# Patient Record
Sex: FEMALE | URBAN - METROPOLITAN AREA
[De-identification: names, ages, dates, MRNs, and addresses within clinical notes are randomized per-mention and may not be internally consistent; named-entity substitution may affect disease eponyms.]

---

## 2017-01-31 NOTE — LETTER
December 11, 2023         Kevin Quan MD  [address]      Patient: Tommy Caro   YOB: 1945   Date of Visit: 12/11/2023       Dear Dr. Susie Viramontes,    I saw your patient, Tommy Caro, on 12/11/2023. Enclosed is my consultation / progress note from that encounter. Thank you for allowing me to participate in the care of this patient.     Sincerely,                           Roxy Villarreal MD  [facility] [employer], [address] [place], [place] [id number]    Document electronically generated by:  Roxy Villarreal MD on 12/11/2023    CC: No Recipients    Enclosure denies

## 2017-02-07 PROBLEM — E06.3 HYPOTHYROIDISM DUE TO HASHIMOTO'S THYROIDITIS: Status: ACTIVE | Noted: 2017-02-07

## 2017-02-07 PROBLEM — E03.8 HYPOTHYROIDISM DUE TO HASHIMOTO'S THYROIDITIS: Status: ACTIVE | Noted: 2017-02-07

## 2017-11-21 PROBLEM — M81.0 AGE-RELATED OSTEOPOROSIS WITHOUT CURRENT PATHOLOGICAL FRACTURE: Status: ACTIVE | Noted: 2017-11-21

## 2022-05-18 ENCOUNTER — APPOINTMENT (OUTPATIENT)
Dept: URBAN - METROPOLITAN AREA CLINIC 241 | Age: 77
Setting detail: DERMATOLOGY
End: 2022-05-27

## 2022-05-18 DIAGNOSIS — L40.0 PSORIASIS VULGARIS: ICD-10-CM

## 2022-05-18 PROCEDURE — OTHER PRESCRIPTION MEDICATION MANAGEMENT: OTHER

## 2022-05-18 PROCEDURE — OTHER ADDITIONAL NOTES: OTHER

## 2022-05-18 PROCEDURE — 99213 OFFICE O/P EST LOW 20 MIN: CPT

## 2022-05-18 PROCEDURE — OTHER COUNSELING: OTHER

## 2022-05-18 ASSESSMENT — LOCATION SIMPLE DESCRIPTION DERM
LOCATION SIMPLE: RIGHT FOOT
LOCATION SIMPLE: RIGHT HAND
LOCATION SIMPLE: LEFT FOOT
LOCATION SIMPLE: RIGHT FOREARM
LOCATION SIMPLE: LEFT HAND
LOCATION SIMPLE: LEFT FOREARM

## 2022-05-18 ASSESSMENT — LOCATION DETAILED DESCRIPTION DERM
LOCATION DETAILED: LEFT ULNAR DORSAL HAND
LOCATION DETAILED: RIGHT DORSAL FOOT
LOCATION DETAILED: LEFT DORSAL FOOT
LOCATION DETAILED: RIGHT PROXIMAL DORSAL FOREARM
LOCATION DETAILED: RIGHT RADIAL DORSAL HAND
LOCATION DETAILED: LEFT DISTAL DORSAL FOREARM

## 2022-05-18 ASSESSMENT — LOCATION ZONE DERM
LOCATION ZONE: ARM
LOCATION ZONE: HAND
LOCATION ZONE: FEET

## 2022-05-18 NOTE — PROCEDURE: PRESCRIPTION MEDICATION MANAGEMENT
Continue Regimen: Present management with rheumatologist. \\nContinue clobetasol 0.05% topical cream BID as needed
Detail Level: Zone
Render In Strict Bullet Format?: No

## 2022-05-18 NOTE — PROCEDURE: ADDITIONAL NOTES
Detail Level: Simple
Render Risk Assessment In Note?: no
Additional Notes: Stressed importance of regular skin exams. Advised patient to return to clinic if any new or changing spots. Emphasized that if patient goes on a biologic for psoriatic arthritis, she especially needs to be doing routine skin exams - she gives understanding.

## 2022-08-17 ENCOUNTER — APPOINTMENT (OUTPATIENT)
Dept: URBAN - METROPOLITAN AREA CLINIC 241 | Age: 77
Setting detail: DERMATOLOGY
End: 2022-08-19

## 2022-08-17 DIAGNOSIS — D22 MELANOCYTIC NEVI: ICD-10-CM

## 2022-08-17 DIAGNOSIS — L40.0 PSORIASIS VULGARIS: ICD-10-CM

## 2022-08-17 DIAGNOSIS — Z87.2 PERSONAL HISTORY OF DISEASES OF THE SKIN AND SUBCUTANEOUS TISSUE: ICD-10-CM

## 2022-08-17 DIAGNOSIS — M31.0 HYPERSENSITIVITY ANGIITIS: ICD-10-CM

## 2022-08-17 DIAGNOSIS — L85.3 XEROSIS CUTIS: ICD-10-CM

## 2022-08-17 DIAGNOSIS — D18.0 HEMANGIOMA: ICD-10-CM

## 2022-08-17 DIAGNOSIS — Z85.820 PERSONAL HISTORY OF MALIGNANT MELANOMA OF SKIN: ICD-10-CM

## 2022-08-17 DIAGNOSIS — L57.8 OTHER SKIN CHANGES DUE TO CHRONIC EXPOSURE TO NONIONIZING RADIATION: ICD-10-CM

## 2022-08-17 DIAGNOSIS — L82.1 OTHER SEBORRHEIC KERATOSIS: ICD-10-CM

## 2022-08-17 DIAGNOSIS — D49.2 NEOPLASM OF UNSPECIFIED BEHAVIOR OF BONE, SOFT TISSUE, AND SKIN: ICD-10-CM

## 2022-08-17 DIAGNOSIS — Z85.828 PERSONAL HISTORY OF OTHER MALIGNANT NEOPLASM OF SKIN: ICD-10-CM

## 2022-08-17 PROBLEM — D18.01 HEMANGIOMA OF SKIN AND SUBCUTANEOUS TISSUE: Status: ACTIVE | Noted: 2022-08-17

## 2022-08-17 PROBLEM — D22.62 MELANOCYTIC NEVI OF LEFT UPPER LIMB, INCLUDING SHOULDER: Status: ACTIVE | Noted: 2022-08-17

## 2022-08-17 PROBLEM — D22.61 MELANOCYTIC NEVI OF RIGHT UPPER LIMB, INCLUDING SHOULDER: Status: ACTIVE | Noted: 2022-08-17

## 2022-08-17 PROBLEM — D22.5 MELANOCYTIC NEVI OF TRUNK: Status: ACTIVE | Noted: 2022-08-17

## 2022-08-17 PROBLEM — D22.72 MELANOCYTIC NEVI OF LEFT LOWER LIMB, INCLUDING HIP: Status: ACTIVE | Noted: 2022-08-17

## 2022-08-17 PROBLEM — L30.9 DERMATITIS, UNSPECIFIED: Status: ACTIVE | Noted: 2022-08-17

## 2022-08-17 PROBLEM — D22.71 MELANOCYTIC NEVI OF RIGHT LOWER LIMB, INCLUDING HIP: Status: ACTIVE | Noted: 2022-08-17

## 2022-08-17 PROCEDURE — OTHER PRESCRIPTION MEDICATION MANAGEMENT: OTHER

## 2022-08-17 PROCEDURE — OTHER BIOPSY BY PUNCH METHOD: OTHER

## 2022-08-17 PROCEDURE — 99213 OFFICE O/P EST LOW 20 MIN: CPT | Mod: 25

## 2022-08-17 PROCEDURE — A4550 SURGICAL TRAYS: HCPCS

## 2022-08-17 PROCEDURE — OTHER COUNSELING: OTHER

## 2022-08-17 PROCEDURE — OTHER ADDITIONAL NOTES: OTHER

## 2022-08-17 PROCEDURE — 11104 PUNCH BX SKIN SINGLE LESION: CPT

## 2022-08-17 PROCEDURE — OTHER MIPS QUALITY: OTHER

## 2022-08-17 ASSESSMENT — LOCATION SIMPLE DESCRIPTION DERM
LOCATION SIMPLE: RIGHT THIGH
LOCATION SIMPLE: RIGHT FOOT
LOCATION SIMPLE: UPPER BACK
LOCATION SIMPLE: ABDOMEN
LOCATION SIMPLE: RIGHT UPPER ARM
LOCATION SIMPLE: LEFT THIGH
LOCATION SIMPLE: LEFT UPPER ARM
LOCATION SIMPLE: RIGHT HAND
LOCATION SIMPLE: LEFT CHEEK
LOCATION SIMPLE: LEFT PRETIBIAL REGION
LOCATION SIMPLE: LEFT FOREARM
LOCATION SIMPLE: LEFT FOOT
LOCATION SIMPLE: CHEST
LOCATION SIMPLE: RIGHT PRETIBIAL REGION
LOCATION SIMPLE: RIGHT FOREARM
LOCATION SIMPLE: LEFT HAND

## 2022-08-17 ASSESSMENT — LOCATION DETAILED DESCRIPTION DERM
LOCATION DETAILED: LEFT PROXIMAL DORSAL FOREARM
LOCATION DETAILED: RIGHT ANTERIOR PROXIMAL UPPER ARM
LOCATION DETAILED: RIGHT RADIAL DORSAL HAND
LOCATION DETAILED: RIGHT VENTRAL PROXIMAL FOREARM
LOCATION DETAILED: RIGHT DISTAL DORSAL FOREARM
LOCATION DETAILED: LEFT INFERIOR CENTRAL MALAR CHEEK
LOCATION DETAILED: LEFT ULNAR DORSAL HAND
LOCATION DETAILED: LEFT DORSAL FOOT
LOCATION DETAILED: RIGHT DORSAL FOOT
LOCATION DETAILED: UPPER STERNUM
LOCATION DETAILED: RIGHT PROXIMAL PRETIBIAL REGION
LOCATION DETAILED: EPIGASTRIC SKIN
LOCATION DETAILED: LEFT ANTERIOR DISTAL THIGH
LOCATION DETAILED: SUPERIOR THORACIC SPINE
LOCATION DETAILED: LEFT ANTERIOR PROXIMAL UPPER ARM
LOCATION DETAILED: INFERIOR THORACIC SPINE
LOCATION DETAILED: RIGHT ANTERIOR DISTAL THIGH
LOCATION DETAILED: LEFT ANTERIOR PROXIMAL THIGH
LOCATION DETAILED: LEFT DISTAL PRETIBIAL REGION
LOCATION DETAILED: LEFT VENTRAL PROXIMAL FOREARM
LOCATION DETAILED: LEFT DISTAL DORSAL FOREARM
LOCATION DETAILED: RIGHT ANTERIOR PROXIMAL THIGH
LOCATION DETAILED: LEFT MEDIAL SUPERIOR CHEST
LOCATION DETAILED: RIGHT PROXIMAL DORSAL FOREARM

## 2022-08-17 ASSESSMENT — LOCATION ZONE DERM
LOCATION ZONE: ARM
LOCATION ZONE: HAND
LOCATION ZONE: FACE
LOCATION ZONE: LEG
LOCATION ZONE: FEET
LOCATION ZONE: TRUNK

## 2022-08-17 NOTE — PROCEDURE: BIOPSY BY PUNCH METHOD
Validate Triangulation: No
Was A Bandage Applied: Yes
Suture Removal: 12 days
Biopsy Type: H and E
Billing Type: Third-Party Bill
Consent: Written consent was obtained and risks were reviewed including but not limited to scarring, infection, bleeding, scabbing, incomplete removal, nerve damage and allergy to anesthesia.
Information: Selecting Yes will display possible errors in your note based on the variables you have selected. This validation is only offered as a suggestion for you. PLEASE NOTE THAT THE VALIDATION TEXT WILL BE REMOVED WHEN YOU FINALIZE YOUR NOTE. IF YOU WANT TO FAX A PRELIMINARY NOTE YOU WILL NEED TO TOGGLE THIS TO 'NO' IF YOU DO NOT WANT IT IN YOUR FAXED NOTE.
Body Location Override (Optional - Billing Will Still Be Based On Selected Body Map Location If Applicable): left dorsal forearm
Size Of Lesion In Cm (Optional): 0
Anesthesia Volume In Cc (Will Not Render If 0): 0.5
Anesthesia Type: 1% lidocaine with epinephrine
Post-Care Instructions: I reviewed with the patient in detail post-care instructions. Patient is to keep the biopsy site dry overnight, and then apply bacitracin twice daily until healed. Patient may apply hydrogen peroxide soaks to remove any crusting.
Hemostasis: None
Detail Level: Detailed
Epidermal Sutures: 4-0 Nylon
Wound Care: Petrolatum
Dressing: bandage
Punch Size In Mm: 4
Home Suture Removal Text: Patient was provided a home suture removal kit and will remove their sutures at home.  If they have any questions or difficulties they will call the office.
Notification Instructions: Patient will be notified of biopsy results. However, patient instructed to call the office if not contacted within 2 weeks.

## 2022-08-17 NOTE — PROCEDURE: PRESCRIPTION MEDICATION MANAGEMENT
Render In Strict Bullet Format?: No
Detail Level: Simple
Detail Level: Zone
Initiate Treatment: Clobetasol 0.05% cream BID (pt has Rx at home)
Continue Regimen: systemic medication management with rheumatologist. \\nContinue clobetasol 0.05% topical cream BID as needed

## 2022-08-26 ENCOUNTER — APPOINTMENT (OUTPATIENT)
Dept: URBAN - METROPOLITAN AREA CLINIC 241 | Age: 77
Setting detail: DERMATOLOGY
End: 2022-08-26

## 2022-08-26 DIAGNOSIS — Z48.02 ENCOUNTER FOR REMOVAL OF SUTURES: ICD-10-CM

## 2022-08-26 PROCEDURE — OTHER SUTURE REMOVAL (GLOBAL PERIOD): OTHER

## 2022-08-26 PROCEDURE — 99024 POSTOP FOLLOW-UP VISIT: CPT

## 2022-08-26 ASSESSMENT — LOCATION ZONE DERM: LOCATION ZONE: ARM

## 2022-08-26 ASSESSMENT — LOCATION DETAILED DESCRIPTION DERM: LOCATION DETAILED: LEFT VENTRAL PROXIMAL FOREARM

## 2022-08-26 ASSESSMENT — LOCATION SIMPLE DESCRIPTION DERM: LOCATION SIMPLE: LEFT FOREARM

## 2022-08-26 NOTE — PROCEDURE: SUTURE REMOVAL (GLOBAL PERIOD)
Add 21783 Cpt? (Important Note: In 2017 The Use Of 36651 Is Being Tracked By Cms To Determine Future Global Period Reimbursement For Global Periods): yes
Detail Level: Detailed
Body Location Override (Optional - Billing Will Still Be Based On Selected Body Map Location If Applicable): left dorsal forearm

## 2023-08-15 ENCOUNTER — TELEPHONE (OUTPATIENT)
Dept: RHEUMATOLOGY | Facility: CLINIC | Age: 78
End: 2023-08-15

## 2023-08-15 NOTE — TELEPHONE ENCOUNTER
[facility] in [facility] called. Patient needs new order for infusion.      Fax number:[phone]    LOV: 4/6/23  Future Appointments   Date Time Provider Roseann Bronson   8/24/2023  9:20 AM Myriam Lewis MD [facility] [address]   Labs:

## 2023-08-24 ENCOUNTER — OFFICE VISIT (OUTPATIENT)
Dept: RHEUMATOLOGY | Facility: CLINIC | Age: 78
End: 2023-08-24
Payer: MEDICARE

## 2023-08-24 VITALS
SYSTOLIC BLOOD PRESSURE: 140 MMHG | WEIGHT: 194 LBS | TEMPERATURE: 98 F | BODY MASS INDEX: 30.45 KG/M2 | HEIGHT: 67 IN | HEART RATE: 53 BPM | DIASTOLIC BLOOD PRESSURE: 70 MMHG | RESPIRATION RATE: 16 BRPM | OXYGEN SATURATION: 98 %

## 2023-08-24 DIAGNOSIS — M06.09 RHEUMATOID ARTHRITIS OF MULTIPLE SITES WITHOUT RHEUMATOID FACTOR (HCC): ICD-10-CM

## 2023-08-24 DIAGNOSIS — M81.8 OTHER OSTEOPOROSIS WITHOUT CURRENT PATHOLOGICAL FRACTURE: ICD-10-CM

## 2023-08-24 DIAGNOSIS — L40.50 PSORIATIC ARTHRITIS (HCC): Primary | ICD-10-CM

## 2023-08-24 DIAGNOSIS — M15.9 PRIMARY OSTEOARTHRITIS INVOLVING MULTIPLE JOINTS: ICD-10-CM

## 2023-08-24 DIAGNOSIS — M19.90 OSTEOARTHRITIS, UNSPECIFIED OSTEOARTHRITIS TYPE, UNSPECIFIED SITE: ICD-10-CM

## 2023-08-24 DIAGNOSIS — E27.40 CHRONIC ADRENAL INSUFFICIENCY (HCC): ICD-10-CM

## 2023-08-24 PROBLEM — M81.0 AGE-RELATED OSTEOPOROSIS WITHOUT CURRENT PATHOLOGICAL FRACTURE: Status: RESOLVED | Noted: 2017-11-21 | Resolved: 2023-08-24

## 2023-08-24 PROCEDURE — 99215 OFFICE O/P EST HI 40 MIN: CPT | Performed by: INTERNAL MEDICINE

## 2023-08-24 RX ORDER — CEPHALEXIN 250 MG/1
250 CAPSULE ORAL EVERY OTHER DAY
COMMUNITY

## 2023-08-24 RX ORDER — CERTOLIZUMAB PEGOL 200 MG/ML
400 INJECTION, SOLUTION SUBCUTANEOUS
COMMUNITY

## 2023-08-24 RX ORDER — ENALAPRIL MALEATE 10 MG/1
10 TABLET ORAL 2 TIMES DAILY
COMMUNITY
Start: 2023-05-31

## 2023-08-24 RX ORDER — LEVOTHYROXINE SODIUM 112 UG/1
112 TABLET ORAL
COMMUNITY
Start: 2023-06-12

## 2023-08-24 RX ORDER — FLUDROCORTISONE ACETATE 0.1 MG/1
0.1 TABLET ORAL DAILY
COMMUNITY

## 2023-08-24 RX ORDER — LEFLUNOMIDE 10 MG/1
10 TABLET ORAL DAILY
Qty: 30 TABLET | Refills: 1 | Status: SHIPPED | OUTPATIENT
Start: 2023-08-24 | End: 2023-08-25

## 2023-08-24 RX ORDER — AMLODIPINE BESYLATE 5 MG/1
5 TABLET ORAL DAILY
COMMUNITY
Start: 2023-01-25

## 2023-08-24 RX ORDER — CARVEDILOL 12.5 MG/1
12.5 TABLET ORAL 2 TIMES DAILY WITH MEALS
COMMUNITY

## 2023-08-24 NOTE — TELEPHONE ENCOUNTER
We need to take over cimzia injections at [facility] infusion center in [place]    Please see if this is possible    And patient getting labs Tuesday

## 2023-08-24 NOTE — TELEPHONE ENCOUNTER
CAPRINI SCORE    AGE RELATED RISK FACTORS                                                       MOBILITY RELATED FACTORS  [ ] Age 41-60 years                                            (1 Point)                  [ ] Bed rest                                                        (1 Point)  [ ] Age: 61-74 years                                           (2 Points)                [ ] Plaster cast                                                   (2 Points)  [x] Age= 75 years                                              (3 Points)                 [ ] Bed bound for more than 72 hours                   (2 Points)    DISEASE RELATED RISK FACTORS                                               GENDER SPECIFIC FACTORS  [ ] Edema in the lower extremities                       (1 Point)                  [ ] Pregnancy                                                     (1 Point)  [ ] Varicose veins                                               (1 Point)                  [ ] Post-partum < 6 weeks                                   (1 Point)             [ ] BMI > 25 Kg/m2                                            (1 Point)                  [ ] Hormonal therapy  or oral contraception            (1 Point)                 [ ] Sepsis (in the previous month)                        (1 Point)                  [ ] History of pregnancy complications  [ ] Pneumonia or serious lung disease                                               [ ] Unexplained or recurrent                       (1 Point)           (in the previous month)                               (1 Point)  [ ] Abnormal pulmonary function test                     (1 Point)                 SURGERY RELATED RISK FACTORS  [ ] Acute myocardial infarction                              (1 Point)                 [ ]  Section                                            (1 Point)  [ ] Congestive heart failure (in the previous month)  (1 Point)                 [ ] Minor surgery                                                 (1 Point)   [ ] Inflammatory bowel disease                             (1 Point)                 [ ] Arthroscopic surgery                                        (2 Points)  [ ] Central venous access                                    (2 Points)                [x] General surgery lasting more than 45 minutes   (2 Points)       [ ] Stroke (in the previous month)                          (5 Points)               [ ] Elective arthroplasty                                        (5 Points)                                                                                                                                               HEMATOLOGY RELATED FACTORS                                                 TRAUMA RELATED RISK FACTORS  [ ] Prior episodes of VTE                                     (3 Points)                 [ ] Fracture of the hip, pelvis, or leg                       (5 Points)  [ ] Positive family history for VTE                         (3 Points)                 [ ] Acute spinal cord injury (in the previous month)  (5 Points)  [ ] Prothrombin 75604 A                                      (3 Points)                 [ ] Paralysis  (less than 1 month)                          (5 Points)  [ ] Factor V Leiden                                             (3 Points)                 [ ] Multiple Trauma within 1 month                         (5 Points)  [ ] Lupus anticoagulants                                     (3 Points)                                                           [ ] Anticardiolipin antibodies                                (3 Points)                                                       [ ] High homocysteine in the blood                      (3 Points)                                             [ ] Other congenital or acquired thrombophilia       (3 Points)                                                [ ] Heparin induced thrombocytopenia                  (3 Points)                                        [x] Present or previous malignancy                     (2 points)  Total Score [     7     ] Order for Cimzia at [facility] faxed to [address] [place] [address] for approval.

## 2023-08-24 NOTE — PATIENT INSTRUCTIONS
OSTEOARTHRITIS    Fast Facts    Though some of the joint changes are irreversible, most patients will not need joint replacement surgery. OA symptoms (what you feel) can vary greatly among patients. A rheumatologist can detect arthritis and prescribe the proper treatment. The goal of treatment in OA is to reduce pain and improve function. Exercise is an important part of OA treatment, because it can decrease joint pain and improve function. At present, there is no treatment that can reverse the damage of OA in the joints. Researchers are trying to find ways to slow or reverse this joint damage. Osteoarthritis (also known as OA) is a common joint disease that most often affects middle-age to elderly people. It is commonly referred to as \"wear and tear\" of the joints, but we now know that OA is a disease of the entire joint, involving the cartilage, joint lining, ligaments, and bone. Although it is more common in older people, it is not really accurate to say that the joints are just \"wearing out. \" It is characterized by breakdown of the cartilage (the tissue that cushions the ends of the bones between joints), bony changes of the joints, deterioration of tendons and ligaments, and various degrees of inflammation of the joint lining (called the synovium). This arthritis tends to occur in the hand joints, spine, hips, knees, and great toes. The lifetime risk of developing OA of the knee is about 46%, and the lifetime risk of developing OA of the hip is 25%, according to the [facility], a long-term study from the [address] [place] [address] and sponsored by [employer] for [employer] and [employer] (often called the [employer]) and the [employer]. OA is a top cause of disability in older people. The goal of osteoarthritis treatment is to reduce pain and improve function.  There is no cure for the disease, but some treatments attempt to slow disease progression. What is osteoarthritis? OA is a frequently slowly progressive joint disease typically seen in middle-aged to elderly people. In osteoarthritis, the cartilage between the bones in the joint breaks down. This causes the affected bones to slowly get bigger. The joint cartilage often breaks down because of mechanical stress or biochemical changes within the body, causing the bone underneath to fail. OA can occur together with other types of arthritis, such as gout or rheumatoid arthritis. OA tends to affect commonly used joints such as the hands and spine, and the weight-bearing joints such as the hips and knees. Symptoms include:    Joint pain and stiffness    Knobby swelling at the joint    Cracking or grinding noise with joint movement    Decreased function of the joint    How do you treat osteoarthritis? There is no proven treatment yet that can reverse joint damage from OA. The goal of osteoarthritis treatment is to reduce pain and improve function of the affected joints. Most often, this is possible with a mixture of physical measures and drug therapy and, sometimes, surgery. Physical measures: Weight loss and exercise are useful in OA. Excess weight puts stress on your knee joints and hips and low back. For every 10 pounds of weight you lose over 10 years, you can reduce the chance of developing knee OA by up to 50 percent. Exercise can improve your muscle strength, decrease joint pain and stiffness, and lower the chance of disability due to OA. Also helpful are support (\"assistive\") devices, such as orthotics or a walking cane, that help you do daily activities. Heat or cold therapy can help relieve OA symptoms for a short time. Certain alternative treatments such as spa (hot tub), massage, and chiropractic manipulation can help relieve pain for a short time. They can be costly, though, and require repeated treatments.  Also, the long-term benefits of these alternative (sometimes called complementary or integrative) medicine treatments are unproven but are under study. Drug therapy: Forms of drug therapy include topical, oral (by mouth) and injections (shots). You apply topical drugs directly on the skin over the affected joints. These medicines include capsaicin cream, lidocaine and diclofenac gel. Oral pain relievers such as acetaminophen are common first treatments. So are nonsteroidal anti-inflammatory drugs (often called NSAIDs), which decrease swelling and pain. In 2010, the government (FDA) approved the use of duloxetine (Cymbalta) for chronic (long-term) musculoskeletal pain including from OA. This oral drug is not new. It also is in use for other health concerns, such as mood disorders, nerve pain and fibromyalgia. Patients with more serious pain may need stronger medications, such as prescription narcotics. Joint injections with corticosteroids (sometimes called cortisone shots) or with a form of lubricant called hyaluronic acid can give months of pain relief from OA. This lubricant is given in the knee, and these shots may help delay the need for a knee replacement by a few years in some patients. Surgery: Surgical treatment becomes an option for severe cases. This includes when the joint has serious damage, or when medical treatment fails to relieve pain and you have major loss of function. Surgery may involve arthroscopy, repair of the joint done through small incisions (cuts). If the joint damage cannot be repaired, you may need a joint replacement. Supplements: Many over-the-counter nutrition supplements have been used for osteoarthritis treatment. Most lack good research data to support their effectiveness and safety. Among the most widely used are calcium, vitamin D and omega-3 fatty acids. To ensure safety and avoid drug interactions, consult your doctor or pharmacist before using any of these supplements.  This is especially true when you are combining these supplements with prescribed

## 2023-08-25 ENCOUNTER — TELEPHONE (OUTPATIENT)
Dept: RHEUMATOLOGY | Facility: CLINIC | Age: 78
End: 2023-08-25

## 2023-08-25 RX ORDER — LEFLUNOMIDE 10 MG/1
10 TABLET ORAL DAILY
Qty: 30 TABLET | Refills: 1 | Status: SHIPPED | OUTPATIENT
Start: 2023-08-25

## 2023-08-25 NOTE — PROGRESS NOTES
Pt called office, [address] sent to wrong pharmacy. Pharmacy updated and prescription sent.

## 2023-09-22 RX ORDER — LEFLUNOMIDE 10 MG/1
10 TABLET ORAL DAILY
Qty: 30 TABLET | Refills: 1 | OUTPATIENT
Start: 2023-09-22

## 2023-09-22 NOTE — TELEPHONE ENCOUNTER
Future Appointments   Date Time Provider Roseann Bronson   12/11/2023  9:40 AM Jeannine Lewis Degree, MD [facility] [address]     Last office visit: 8/24/2023    Last fill: 8/25/2023 30 tab, 1 refill    Sending my chart message to pt stating there is another refill at their pharmacy

## 2023-10-23 RX ORDER — LEFLUNOMIDE 10 MG/1
10 TABLET ORAL DAILY
Qty: 30 TABLET | Refills: 1 | Status: SHIPPED | OUTPATIENT
Start: 2023-10-23

## 2023-10-23 NOTE — TELEPHONE ENCOUNTER
LOV: 08/24/23  Future Appointments   Date Time Provider Roseann Bronson   12/11/2023  9:40 AM Андрей Lewis MD [facility] [address]   LABS:   Scanned under Media on 09/19.

## 2023-12-11 ENCOUNTER — OFFICE VISIT (OUTPATIENT)
Dept: RHEUMATOLOGY | Facility: CLINIC | Age: 78
End: 2023-12-11
Payer: MEDICARE

## 2023-12-11 ENCOUNTER — TELEPHONE (OUTPATIENT)
Dept: RHEUMATOLOGY | Facility: CLINIC | Age: 78
End: 2023-12-11

## 2023-12-11 VITALS
BODY MASS INDEX: 29.82 KG/M2 | HEART RATE: 63 BPM | RESPIRATION RATE: 16 BRPM | TEMPERATURE: 97 F | WEIGHT: 190 LBS | DIASTOLIC BLOOD PRESSURE: 66 MMHG | HEIGHT: 67 IN | SYSTOLIC BLOOD PRESSURE: 130 MMHG | OXYGEN SATURATION: 99 %

## 2023-12-11 DIAGNOSIS — L40.50 PSORIATIC ARTHRITIS (HCC): Primary | ICD-10-CM

## 2023-12-11 DIAGNOSIS — E06.3 HYPOTHYROIDISM DUE TO HASHIMOTO'S THYROIDITIS: ICD-10-CM

## 2023-12-11 DIAGNOSIS — E27.40 CHRONIC ADRENAL INSUFFICIENCY (HCC): ICD-10-CM

## 2023-12-11 DIAGNOSIS — R53.82 CHRONIC FATIGUE: ICD-10-CM

## 2023-12-11 DIAGNOSIS — E03.8 HYPOTHYROIDISM DUE TO HASHIMOTO'S THYROIDITIS: ICD-10-CM

## 2023-12-11 PROCEDURE — 99215 OFFICE O/P EST HI 40 MIN: CPT | Performed by: INTERNAL MEDICINE

## 2023-12-11 RX ORDER — LEFLUNOMIDE 20 MG/1
20 TABLET ORAL DAILY
Qty: 90 TABLET | Refills: 0 | Status: SHIPPED | OUTPATIENT
Start: 2023-12-11

## 2023-12-11 RX ORDER — LEFLUNOMIDE 10 MG/1
10 TABLET ORAL DAILY
Qty: 30 TABLET | Refills: 3 | Status: SHIPPED | OUTPATIENT
Start: 2023-12-11 | End: 2023-12-11

## 2023-12-11 NOTE — TELEPHONE ENCOUNTER
Order for Cimzia 400mg monthly at [facility] faxed to [address] [place] [address] for approval

## 2023-12-12 NOTE — TELEPHONE ENCOUNTER
Order for Cimzia faxed over to [facility] 12/12/2023. Order and fax confirmation sent to scan.

## 2023-12-26 ENCOUNTER — TELEPHONE (OUTPATIENT)
Dept: RHEUMATOLOGY | Facility: CLINIC | Age: 78
End: 2023-12-26

## 2023-12-26 NOTE — TELEPHONE ENCOUNTER
Pt increased her leflunomide dosage at her last office visit in the middle of december and has noticed her blood pressure has gotten higher. This morning her bp was 166/95. It has since come down with activity to 144/83.    Please advise.

## 2023-12-26 NOTE — TELEPHONE ENCOUNTER
Called pt and informed her that Dr. Lewis doesn't feel that the change in bp is from the leflunomide, but probably adrenal insufficiency. Pt verbalized understanding and will reach out to both cardiologist and endocrinologist to update them on her symptoms.

## 2024-01-04 ENCOUNTER — TELEPHONE (OUTPATIENT)
Dept: RHEUMATOLOGY | Facility: CLINIC | Age: 79
End: 2024-01-04

## 2024-01-09 ENCOUNTER — TELEPHONE (OUTPATIENT)
Dept: RHEUMATOLOGY | Facility: CLINIC | Age: 79
End: 2024-01-09

## 2024-01-09 NOTE — TELEPHONE ENCOUNTER
Pt states she has also decreased her Arava to 10mg, pt felt more comfortable with that dosing. Pt hopes Dr. Lewis is ok with this.

## 2024-01-09 NOTE — TELEPHONE ENCOUNTER
Called pt, pt would like to know if lab orders were faxed. Explained orders faxed to [facility] at [phone] on 1/4/24.

## 2024-03-08 NOTE — TELEPHONE ENCOUNTER
Future Appointments   Date Time Provider Department Center   6/11/2024 10:00 AM Silvia Lewis MD [facility] [address]     Last office visit: 12/11/2023    Last lab: 11/10/2023 scanned in    Last fill: 12/11/2023 90 tab, 0 refills    Sending pt a my chart message to get labs done or fax last labs to the office

## 2024-03-11 ENCOUNTER — TELEPHONE (OUTPATIENT)
Dept: RHEUMATOLOGY | Facility: CLINIC | Age: 79
End: 2024-03-11

## 2024-03-26 RX ORDER — LEFLUNOMIDE 20 MG/1
20 TABLET ORAL DAILY
Qty: 90 TABLET | Refills: 0 | OUTPATIENT
Start: 2024-03-26

## 2024-03-26 NOTE — TELEPHONE ENCOUNTER
Called pt and asked if she had recent lab work done. Pt had labs done on 3/20/2024 at [facility]. (Called [phone] to get lab results faxed to [facility])    Pt stated she doesn't need a refill at this time. She is only taking half a tab so she has extra at home.     Declining refill

## 2024-05-17 ENCOUNTER — TELEPHONE (OUTPATIENT)
Dept: RHEUMATOLOGY | Facility: CLINIC | Age: 79
End: 2024-05-17

## 2024-05-17 DIAGNOSIS — L40.50 PSORIATIC ARTHRITIS (HCC): Primary | ICD-10-CM

## 2024-05-17 DIAGNOSIS — M06.09 RHEUMATOID ARTHRITIS OF MULTIPLE SITES WITHOUT RHEUMATOID FACTOR (HCC): ICD-10-CM

## 2024-05-17 DIAGNOSIS — R53.82 CHRONIC FATIGUE: ICD-10-CM

## 2024-05-17 NOTE — TELEPHONE ENCOUNTER
New standing lab orders have been entered into the system per patient's request. Patient notified. Orders have been faxed to [facility] per patient request at [phone].   ,DirectAddress_Unknown

## 2024-05-29 ENCOUNTER — TELEPHONE (OUTPATIENT)
Dept: RHEUMATOLOGY | Facility: CLINIC | Age: 79
End: 2024-05-29

## 2024-05-29 NOTE — TELEPHONE ENCOUNTER
Spoke to patient regarding her lab results. Informed patient that we never received her lab results and asked her to call [facility] and have them send the results to us for  to review. Patient verbalized understanding and stated that she will call and have the results sent to the office.

## 2024-05-29 NOTE — TELEPHONE ENCOUNTER
LOV: 12/11/2023    RTC: 6 months   Future Appointments   Date Time Provider Department Center   6/11/2024 10:00 AM Silvia Lewis MD [facility] [address]   LABS: not completed at this time.     LAST REFILL: 12/11/2023, Quantity 90 tablets, Refills 0    MCM sent as a reminder to complete all outstanding lab orders. Awaiting possible response.

## 2024-06-03 ENCOUNTER — TELEPHONE (OUTPATIENT)
Dept: RHEUMATOLOGY | Facility: CLINIC | Age: 79
End: 2024-06-03

## 2024-06-03 DIAGNOSIS — L40.50 PSORIATIC ARTHRITIS (HCC): Primary | ICD-10-CM

## 2024-06-03 DIAGNOSIS — M06.09 RHEUMATOID ARTHRITIS OF MULTIPLE SITES WITHOUT RHEUMATOID FACTOR (HCC): ICD-10-CM

## 2024-06-03 RX ORDER — LEFLUNOMIDE 20 MG/1
20 TABLET ORAL DAILY
Qty: 90 TABLET | Refills: 0 | Status: SHIPPED | OUTPATIENT
Start: 2024-06-03

## 2024-06-03 RX ORDER — LEFLUNOMIDE 20 MG/1
20 TABLET ORAL DAILY
Qty: 90 TABLET | Refills: 0 | OUTPATIENT
Start: 2024-06-03

## 2024-06-03 NOTE — TELEPHONE ENCOUNTER
LOV:   12/11/2023    Future Appointments   Date Time Provider Department Center   6/11/2024 10:00 AM Silvia Lewis MD [facility] [address]     LF:  12/11/2023    QTY:   90    Refills:  0    LABS: DRAWN just waiting on the results from [facility].

## 2024-06-03 NOTE — TELEPHONE ENCOUNTER
Spoke to patient and let her know that we have not received her lab results as of yet from [facility]. Patient states that she is going to call them back and ask them to refax the results to our office for Dr to review. Patient states that she needs a refill on her Leflunomide because she only has a pill for tomorrow and that is it.

## 2024-06-11 ENCOUNTER — OFFICE VISIT (OUTPATIENT)
Dept: RHEUMATOLOGY | Facility: CLINIC | Age: 79
End: 2024-06-11
Payer: MEDICARE

## 2024-06-11 VITALS
WEIGHT: 192 LBS | TEMPERATURE: 98 F | DIASTOLIC BLOOD PRESSURE: 60 MMHG | HEIGHT: 67 IN | SYSTOLIC BLOOD PRESSURE: 122 MMHG | RESPIRATION RATE: 16 BRPM | HEART RATE: 67 BPM | BODY MASS INDEX: 30.13 KG/M2 | OXYGEN SATURATION: 96 %

## 2024-06-11 DIAGNOSIS — E06.3 HYPOTHYROIDISM DUE TO HASHIMOTO'S THYROIDITIS: ICD-10-CM

## 2024-06-11 DIAGNOSIS — Z79.899 ENCOUNTER FOR DRUG THERAPY: ICD-10-CM

## 2024-06-11 DIAGNOSIS — M15.9 PRIMARY OSTEOARTHRITIS INVOLVING MULTIPLE JOINTS: ICD-10-CM

## 2024-06-11 DIAGNOSIS — E03.8 HYPOTHYROIDISM DUE TO HASHIMOTO'S THYROIDITIS: ICD-10-CM

## 2024-06-11 DIAGNOSIS — E27.40 CHRONIC ADRENAL INSUFFICIENCY (HCC): ICD-10-CM

## 2024-06-11 DIAGNOSIS — R53.83 OTHER FATIGUE: ICD-10-CM

## 2024-06-11 DIAGNOSIS — L40.50 PSORIATIC ARTHRITIS (HCC): ICD-10-CM

## 2024-06-11 DIAGNOSIS — D84.9 IMMUNOCOMPROMISED STATE (HCC): ICD-10-CM

## 2024-06-11 DIAGNOSIS — Z85.820 HISTORY OF MELANOMA: Primary | ICD-10-CM

## 2024-06-11 PROCEDURE — G2211 COMPLEX E/M VISIT ADD ON: HCPCS | Performed by: INTERNAL MEDICINE

## 2024-06-11 PROCEDURE — 99214 OFFICE O/P EST MOD 30 MIN: CPT | Performed by: INTERNAL MEDICINE

## 2024-06-11 NOTE — PROGRESS NOTES
[facility], [address] [place] [address]      Consult     Karmen Joseph Patient Status:  No patient class for patient encounter    1945 MRN [id number]   Location [facility], [address] [place] [address] Attending No att. providers found   Hosp Day # 0 PCP RAFITA MATA     Referring Provider: PCP    Reason for Consultation: Seronegative RA/psoriatic arthritis    Subjective:    Karmen Joseph is a 78 year old female with     Reason for Consult: history of seronegative rheumatoid arthritis? Psoriasis    HPI: 78-year-old woman comes in for further evaluation for worsening joint pain and stiffness swelling initially started 2021. She was seen by rheumatologist who suggested possibility likely of seronegative rheumatoid arthritis with normal CCP. There is no x-rays that were done at the time she was initially started on hydroxychloroquine and 2021 and then progress to treatment with methotrexate because of lack of response up to 15 mg a week. 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    Her rheumatologist at that point because of pharmacy stating that they cannot place both hydrocortisone and prednisone together stopped prednisone and all her medications including methotrexate and hydroxychloroquine    Patient states significant swelling and also noticed 4 months into treatment with hydroxychloroquine and 1-2 weeks on treatment with methotrexate developing psoriasis on her lower extremities and small patches and also a few areas on her scalp.    Patient's daughter who is also with her states she has psoriasis untreated.    She is also noted some nail bed changes of her feet and pitting of her nailbeds.    She has not been placed on Biologics.    She apparently has a history of osteoporosis thinks her last DEXA scan was 2 years ago she has been on Fosamax 70  mg once a week and per patient's daughter she is quite miserable after taking it with significant side effects but has continued despite this.    She also has hypothyroidism on Synthroid that is fairly well-controlled    She is on hydrochlorothiazide for blood pressure which is fairly well-controlled. States no known family history of autoimmune disease but does have history of gout that runs in the family there is no gout testing that was noted with labs    Patient states no shortness of breath or chest pain no fevers or chills or infections prior to the onset of symptoms her booster vaccination was December 2021 prior to the onset of her symptoms and her last vaccinations were February 2021 prior to that    She was last seen in clinic March 2022 as a new patient  We had diagnosed her with likelihood of psoriatic arthritis with significant psoriasis and polyarthritis  We have given her Depo Medrol 80 mg subcutaneous injection with prednisone and taper down to 10 mg daily  We had discussed addition of Biologics and after going back and forth with her dermatologist we initially tried stellara but her insurance denied this.  Because of her history of malignant melanoma and skin cancer we are reluctant to consider TNF treatment that we have discussed risks versus benefit at this time and they decided to proceed with cimzia with close monitoring with her dermatologist revolving Skin cancers  Potential risks and side effects discussed in detail today  Unfortunately other Biologics that we have discussed her not being covered  Long-term risk of steroids discussed.    She was last seen in clinic December 2023    Patient was off steroids and doing well on Cimzia injections    Had stopped sulfasalazine because of no benefit    Has no further stiffness or pain that is concerning at the ankles or feet.     Continued on leflunomide 20 mg daily    States she is overdue for her Cimzia injection by 1 week and has noticed mild  stiffness in her hands.      Per patient's daughter closer to her infusion injection of Cimzia she does get mild stiffness and swelling that is more annoying and bothersome.    She has remained off steroids.  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 and is followed by endocrinology    Patient aches and pains in the neck and back but not too problematic they are not interested in proceeding with imaging is open to physical therapy for strengthening    Her blood pressure has been overall stable and is followed by cardiology and endocrinology    Did have 1 hospitalization January 2024 for blood pressure fluctuation but otherwise doing well    Previously in December 2022 she also stopped hydrochlorothiazide because of problems with dehydration and hyponatremia with her infections and her cardiologist had added low-dose Norvasc 5 mg and an ARB within the last few weeks    She is seeing cardiology and endocrinology today    The endocrinologist does thinks the fluctuations in her blood pressure could also be 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 She states the blood pressure was a slight problem even before and possibility of being off and taken off the hydrochlorothiazide is likely reason    She is on antibiotic prophylaxis at this time for recurrent UTIs    She has not had an infection at this time    She has no symptoms at this time    Risks and side effects Biologics discussed in detail.    TB testing was normal August 2023    Patient and patient's daughter agree with this plan    Her recent DEXA scan is normal. She had side effects to Fosamax and stopped    We will need to get the last bone density send new orders accordingly    She would like to  continue Citracal twice a day for now    She has not had any falls or fractures       History/Other:        Past Medical History:  Past Medical History:    Essential hypertension    Hypothyroid        Past Surgical History:   Past Surgical History:   Procedure Laterality Date    Cyst removal  2012    from lower back    Tonsillectomy         Social History:  reports that she has never smoked. She does not have any smokeless tobacco history on file. She reports current alcohol use. She reports that she does not use drugs.    Family History:   Family History   Problem Relation Age of Onset    Heart Disorder Father     Hypertension Father     Other (Other[other]) Mother     Cancer Brother         throat     Diabetes Brother        Allergies:   Allergies   Allergen Reactions    Ciprofloxacin OTHER (SEE COMMENTS)     Back tingling sensation and burning feeling inside of body.    Back tingling sensation and burning feeling inside of body.   Back tingling sensation and burning feeling inside of body.    Back tingling sensation and burning feeling inside of body.   Back tingling sensation and burning feeling inside of body.   Back tingling sensation and burning feeling inside of body.   Back tingling sensation and burning feeling inside of body.      Other reaction(s): N+V    Tramadol OTHER (SEE COMMENTS)     Sick to the stomach    Trazodone OTHER (SEE COMMENTS)     Cough, felt \"funny\"    Cough, felt \"funny\"   Cough, felt \"funny\"    Cough, felt \"funny\"   Cough, felt \"funny\"   Cough, felt \"funny\"   Cough, felt \"funny\"      Other reaction(s): GI upset       Current Medications:  Current Outpatient Medications   Medication Sig Dispense Refill    leflunomide (ARAVA) 20 MG Oral Tab Take 1 tablet (20 mg total) by mouth daily. 90 tablet 0    amLODIPine 5 MG Oral Tab Take 2 tablets (10 mg total) by mouth daily.      carvedilol 12.5 MG Oral Tab Take 1 tablet (12.5 mg total) by mouth 2 (two) times daily with meals.      cephalexin 250  MG Oral Cap Take 1 capsule (250 mg total) by mouth every other day.      Certolizumab Pegol (CIMZIA STARTER KIT) 6 X 200 MG/ML Subcutaneous Prefilled Syringe Kit Inject 400 mg into the skin.      enalapril 10 MG Oral Tab Take 1 tablet (10 mg total) by mouth 2 (two) times daily.      fludrocortisone 0.1 MG Oral Tab Take 1 tablet (0.1 mg total) by mouth daily.      levothyroxine 112 MCG Oral Tab Take 1 tablet (112 mcg total) by mouth before breakfast.      hydrocortisone 5 MG Oral Tab Take 3 tablets by mouth in the AM and 2 tablets in the afternoon. Maximum 200 tablets daily for stress dose 550 tablet 1    aspirin 81 MG Oral Tab Take 1 tablet (81 mg total) by mouth daily.      Multiple Vitamins-Minerals (CENTRUM SILVER) Oral Tab Take 1 tablet by mouth daily.      Omega-3 Fatty Acids (FISH OIL OR) Take by mouth daily.      Calcium Carbonate-Vitamin D (CALCIUM + D OR) Take by mouth 2 (two) times daily.      Amylase-Lipase-Protease (DIGESTIVE ENZYMES OR) Take by mouth daily.        No current facility-administered medications for this visit.       (Not in a hospital admission)      Review of Systems:     Constitutional: Negative for chills, , fatigue, fever and unexpected weight change.    HENT: Negative for congestion, and mouth sores.    Eyes: Negative for photophobia, pain, redness and visual disturbance.    Respiratory: Negative for apnea, cough, chest tightness, shortness of breath, wheezing and stridor.    Cardiovascular: Negative for chest pain, palpitations and leg swelling.    Gastrointestinal: Negative for abdominal distention, abdominal pain, blood in stool, constipation, diarrhea and nausea.    Endocrine: Negative.     Genitourinary: Negative for decreased urine volume, difficulty urinating, dyspareunia, dysuria, flank pain, and frequency.    Musculoskeletal: Negative for arthralgias, gait problem and joint swelling.    Skin: Negative for color change, pallor and rash. No raynauds or digital ulcerations no  sclerodactly.    Allergic/Immunologic: Negative.    Neurological: Negative for dizziness, tremors, seizures, syncope, speech difficulty, weakness, light-headedness, numbness and headaches.    Hematological: Does not bruise/bleed easily.    Psychiatric/Behavioral: Negative for confusion, decreased concentration, hallucinations, self-injury, sleep disturbance and suicidal ideas or depression.    Objective:   [unfilled]  Vitals:    06/11/24 1000   BP: 122/60   Pulse: 67   Resp: 16   Temp: 97.8 °F (36.6 °C)          Constitutional: is oriented to person, place, and time. Appears well-developed and well-nourished. No distress.    HEENT: Normocephalic; EOMI; no jvd; no LAD; no oral or nasal ulcers.     Eyes: Conjunctivae and EOM are normal. Pupils are equal, round, and reactive to light.     Neck: Normal range of motion. No thyromegaly present.    Cardiovascular: RRR, no murmurs.    Lungs: Clear, Bilateral air entry, no wheezes.    Abdominal: Soft.    Musculoskeletal:         Joint Exam 06/11/2024        Right  Left   MCP 2  Swollen Tender  Swollen Tender   MCP 3  Swollen Tender  Swollen Tender   PIP 3 (finger)  Swollen       PIP 4 (finger)  Swollen            Swollen: 6      Tender: 4          Right shoulder: Exhibits normal range of motion on abduction and internal rotation, no tenderness, no bony tenderness, no deformity, no laceration, no pain and no spasm.        Left shoulder: Exhibits normal range of motion on abduction and internal and external rotation.  no tenderness, no bony tenderness, no swelling, no effusion, no deformity, no pain, no spasm and normal strength.        Right elbow:  Exhibits normal range of motion, no swelling, no effusion and no deformity. No tenderness found. No medial epicondyle, no lateral epicondyle and no olecranon process tenderness noted. There are no contractures or tophi or nodules.        Left elbow:  Normal range of motion, no swelling, no effusion and no deformity. No  medial epicondyle, no lateral epicondyle and no olecranon process tenderness noted. There are no contractures or tophi or nodules.        Right wrist:  Exhibits normal range of motion, no tenderness, no bony tenderness, no swelling, no effusion and no crepitus. Flexion and extension intact w/o limitation.        Left wrist: Exhibits normal range of motion, no tenderness, no bony tenderness, no swelling, no effusion, no crepitus and no deformity. Flexion and extension intact without limitation.        Right hip: Exhibits normal range of motion, normal strength, no tenderness, no bony tenderness, no swelling and no crepitus.        Right hand: No synovitis of DIP joints; there are scattered Bouchards or Heberden nodules noted;  strength: 95%.        Left hand: No synovitis of  DIP joints; there are scattered Bouchards or Heberden nodules noted;  strength: 95%.        Left hip: Exhibits normal range of motion, normal strength, no tenderness, no bony tenderness, No swelling and no crepitus.        Right knee: Exhibits normal range of motion, no swelling, no effusion, no ecchymosis, no deformity and no erythema. No tenderness found. No medial joint line, no lateral joint line, no MCL and no LCL tenderness noted. No crepitation on flexion of knee and extension normal.        Left knee:  Exhibits normal range of motion, no swelling, no effusion, no ecchymosis and no erythema. No tenderness found. No medial joint line, no lateral joint line and no patellar tendon tenderness noted. No crepitation on flexion of the knee. Extension intact and normal.        Right ankle: mild swelling, no deformity. No tenderness. Dorsiflexion and plantar flexion intact without limitation in range of motion.        Left ankle: Exhibits mild swelling. No tenderness. No lateral malleolus and no medial malleolus tenderness found. Achilles tendon normal. Achilles tendon exhibits no pain, no defect and normal Chan's test results.   Dorsiflexion and plantar flexion intact without limitation in range of motion.        Cervical back: Exhibits normal range of motion, no tenderness, no bony tenderness, no swelling, no pain and no spasm.        Thoracic back: Exhibits normal range of motion, no tenderness, no bony tenderness and no spasm.        Lumbar back:  Exhibits normal range of motion, no tenderness, no bony tenderness, no pain and no spasm.        Right foot: normal. There is normal range of motion, no tenderness, no bony tenderness, no crepitus and no laceration. There is no synovitis or tenderness of the MTP joints to palpation.  Enlargement of the MTP joint        Left foot: normal. There is normal range of motion, no tenderness, no bony tenderness and no crepitus. There is no synovitis or tenderness of the MTP joints to palpation.  No synovitis    Lymphadenopathy: No submental, no submandibular, and no occipital adenopathy present, has no cervical adenopathy or axillary lympadenopathy.    Neurological: Alert and oriented. No focal motor or sensory abnormalities. Strength is 5/5 Upper Extremities/Lower Extremities proximally and distally.    Skin: Skin is warm, dry and intact.    Psychiatric: Normal behavior.    Routine Assessment of Patient Index Data       1. Please check the one that best answers the patients abilities at this time:  A. Dress yourself, including tying shoelaces and doing buttons?: With some difficulty Dress Raw Score: 1     B. Get in and out of bed?: Without any difficulty Bed Raw Score: 0     C. Lift a full cup or glass to your mouth?: Without any difficulty Cup Raw Score: 0     D. Walk outdoors on flat ground?: With some difficulty Walk Raw Score: 1     E. Wash and dry your entire body?: With some difficulty Bathe Raw Score: 1     F. Bend down to  clothing from the floor?: Without any difficulty Bend Raw Score: 0     G. Turn regular faucets on and off?: Without any difficulty Faucet Raw Score: 0     H. Get in  and out of a car, bus, train, or airplane?: With some difficulty Vehicle Raw Score: 1     I. Walk two miles or three kilometers, if you wish?: Unable to do Miles Raw Score: 3     J. Participate in recreational activities and sports as you would like, if you wish?: Unable to do Participate Raw Score: 3     Functional Status (FN) Pre-Score - Rows A-J: 10 A-J FN Converted Score: 3.3       K. Get a good night's sleep?: With much difficulty     L. Deal with feelings of anxiety or being nervous?: With some difficulty     M. Deal with feelings of depression or feeling blue?: Without any difficulty         2. How much pain have you had because of your condition over the past week?:  Patient's Pain Tolerance (PN): 5.0         3. Considering all the ways in which illness and health conditions may affect you at this time, how are you doing?:  Patient's Global Estimate (PTGE): 5.5         RAPID 3 Score and Severity:  RAPID 3 Cumulative Score: 13.8 Severity: High Severity                   Results:    Labs:      No results found for: \"WBC\", \"RBC\", \"HGB\", \"HCT\", \"MCV\", \"MCH\", \"MCHC\", \"RDW\", \"PLT\", \"MPV\", \"LYMPHOCYTES\", \"NEUT\"    No components found for: \"RELY\", \"NMET\", \"MYEL\", \"PROMY\", \"JORDEN\", \"ABSNEUTS\", \"ABSBANDS\", \"ABMM\", \"ABMY\", \"ABPM\", \"ABBL\"      No results found for: \"NA\", \"K\", \"CHLORIDE\", \"CO2\", \"BUN\", \"CREAT\", \"GFR\", \"ALB\", \"ALKPHOS\", \"AST\", \"ALT\"       No components found for: \"ESRWESTERGRN\"       No results found for: \"CRP\"      No results found for: \"COLOR\", \"CLARITY\", \"UROBILINOGEN\", \"YEAST\"  @LABRCNTIP(RF,B12)@      [unfilled]    CBC CMP normal November 2023    Imaging:  No results found.    Assessment & Plan:      78-year-old woman comes in for further evaluation for 6-8 month history of worsening polyarthritis with previous diagnosis of seronegative rheumatoid arthritis    Suspect psoriasis and psoriatic arthritis with significant synovitis dactylitis digital nail pitting and oncho lysis of the nailbeds  Moderate  osteoarthritis multiple joint  On prophylaxis for recurrent UTIs by urology  Hypertension unclear etiology consider being taken off hydrochlorothiazide?  Less likely related to injection with improvement on new regimen    Significant improvement on leflunomide mild synovitis on exam and active disease  Continue Cimzia injections  Patient's daughter and patient state likely because they are overdue on injection by 1 to 2 weeks.  Not interested in switching treatment or going on steroids  Cimzia injection is scheduled for next week  Continue leflunomide to 20 mg daily  Labs stable May 2024  Remain off sulfasalazine  Wean off steroids because of significant fluctuations in blood pressure and [place]'s.  Now on midodrine from endocrinology with stabilization of her blood pressure followed by a another cardiologist as well  Continue Cimzia injections     Other option includes orencia    Potential risks and side effects of TNF treatment and CIMZIA discussed in detail    Continue hydrocortisone 2 PCP for adrenal insufficiency and Chuckie's    He is now followed by endocrinology and the new cardiologist    DEXA scan normal. Patient had side effects to Fosamax will hold for now.  We will need to obtain the date of the last DEXA scan.  Will have her sign a release from [facility] today    She declines any prophylaxis for now. For now continue Citracal twice a day.    Patient and patient's daughter also present agree with this plan    Autoimmune testing unrevealing    repeat urinalysis today     Reviewed results of x-rays showing degenerative osteoarthritis    Potential risks and side effects of Biologics discussed in detail and we'll update chest x-ray and TB testing and send orders    Unfortunately cosentyx and stellara were not covered with her insurance     Requesting another physical therapy order for occasional back pain stiffness not interested in obtaining imaging of the spine    Return to clinic:  Return in  about 6 months (around 12/11/2024).    Silvia Lewis MD  8/24/2023

## 2024-06-24 ENCOUNTER — TELEPHONE (OUTPATIENT)
Dept: RHEUMATOLOGY | Facility: CLINIC | Age: 79
End: 2024-06-24

## 2024-06-24 NOTE — TELEPHONE ENCOUNTER
We have refaxed the Dexa scan JEWELS again to [facility] again and will await the results to be faxed over. I have also reached out to patient and asked her to reach out to [facility] as well.

## 2024-06-25 NOTE — TELEPHONE ENCOUNTER
Per separate TE, the fax number is [phone] in case that is a different number than what was being used

## 2024-07-01 DIAGNOSIS — L40.50 PSORIATIC ARTHRITIS (HCC): ICD-10-CM

## 2024-07-01 DIAGNOSIS — M06.09 RHEUMATOID ARTHRITIS OF MULTIPLE SITES WITHOUT RHEUMATOID FACTOR (HCC): ICD-10-CM

## 2024-07-01 RX ORDER — LEFLUNOMIDE 20 MG/1
20 TABLET ORAL DAILY
Qty: 90 TABLET | Refills: 0 | OUTPATIENT
Start: 2024-07-01

## 2024-07-02 NOTE — TELEPHONE ENCOUNTER
Spoke to patient and let her know that we never received her bone density results from [facility]. Patient states that she is going to call [facility] and have them fax the results to the office for  to review.

## 2024-07-10 ENCOUNTER — TELEPHONE (OUTPATIENT)
Dept: RHEUMATOLOGY | Facility: CLINIC | Age: 79
End: 2024-07-10

## 2024-07-10 NOTE — TELEPHONE ENCOUNTER
Spoke to patient regarding the below message:    Let last bone densities I have that they sent were from 2012 and 2022     Did she get it somewhere else?  [facility].  These are from [facility] in [facility]        Patient verbalized understanding and stated that she had the last one done in 2022 and she is due this year for it and she was waiting for Dr to place an order for her to have it completed. She states that she would like to go to [facility] as she has done in the past. Please place an order for patient to be mailed to her home.

## 2024-07-10 NOTE — TELEPHONE ENCOUNTER
Let last bone densities I have that they sent were from 2012 and 2022    Did she get it somewhere else?  [facility].  These are from [facility] in [facility]

## 2024-07-11 ENCOUNTER — TELEPHONE (OUTPATIENT)
Dept: RHEUMATOLOGY | Facility: CLINIC | Age: 79
End: 2024-07-11

## 2024-07-11 DIAGNOSIS — M85.80 OSTEOPENIA, UNSPECIFIED LOCATION: Primary | ICD-10-CM

## 2024-07-11 DIAGNOSIS — M85.89 OTHER SPECIFIED DISORDERS OF BONE DENSITY AND STRUCTURE, MULTIPLE SITES: ICD-10-CM

## 2024-07-11 NOTE — TELEPHONE ENCOUNTER
Patient's Dexa order faxed over to [facility] at [phone] and fax confirmation received.

## 2024-08-28 NOTE — TELEPHONE ENCOUNTER
[employer] Alert Outreach Telephone Call Attempt     Patient is being outreached by the Care Transitions Program for a clinical alert from the [employer] monitoring program.     Call Attempt Date: 8/28/2024    Call Attempt: Second Attempt    Pt notified

## 2024-09-09 ENCOUNTER — TELEPHONE (OUTPATIENT)
Dept: RHEUMATOLOGY | Facility: CLINIC | Age: 79
End: 2024-09-09

## 2024-09-10 NOTE — TELEPHONE ENCOUNTER
Left message for patient to call the office back regarding her labs. Let her know that we did not receive her lab results in the office. Asked patient to call and request where she had her labs done to fax the results to our office. Patient was asked to call the office back with any questions she may have.

## 2024-09-24 ENCOUNTER — APPOINTMENT (OUTPATIENT)
Dept: URBAN - METROPOLITAN AREA CLINIC 241 | Age: 79
Setting detail: DERMATOLOGY
End: 2024-09-24

## 2024-09-24 DIAGNOSIS — L82.1 OTHER SEBORRHEIC KERATOSIS: ICD-10-CM

## 2024-09-24 DIAGNOSIS — Z85.820 PERSONAL HISTORY OF MALIGNANT MELANOMA OF SKIN: ICD-10-CM

## 2024-09-24 DIAGNOSIS — L57.8 OTHER SKIN CHANGES DUE TO CHRONIC EXPOSURE TO NONIONIZING RADIATION: ICD-10-CM

## 2024-09-24 DIAGNOSIS — Z85.828 PERSONAL HISTORY OF OTHER MALIGNANT NEOPLASM OF SKIN: ICD-10-CM

## 2024-09-24 DIAGNOSIS — Z71.89 OTHER SPECIFIED COUNSELING: ICD-10-CM

## 2024-09-24 DIAGNOSIS — Z87.2 PERSONAL HISTORY OF DISEASES OF THE SKIN AND SUBCUTANEOUS TISSUE: ICD-10-CM

## 2024-09-24 DIAGNOSIS — D18.0 HEMANGIOMA: ICD-10-CM

## 2024-09-24 DIAGNOSIS — L40.0 PSORIASIS VULGARIS: ICD-10-CM

## 2024-09-24 DIAGNOSIS — L85.3 XEROSIS CUTIS: ICD-10-CM

## 2024-09-24 DIAGNOSIS — D22 MELANOCYTIC NEVI: ICD-10-CM

## 2024-09-24 PROBLEM — D22.62 MELANOCYTIC NEVI OF LEFT UPPER LIMB, INCLUDING SHOULDER: Status: ACTIVE | Noted: 2024-09-24

## 2024-09-24 PROBLEM — D18.01 HEMANGIOMA OF SKIN AND SUBCUTANEOUS TISSUE: Status: ACTIVE | Noted: 2024-09-24

## 2024-09-24 PROBLEM — D22.72 MELANOCYTIC NEVI OF LEFT LOWER LIMB, INCLUDING HIP: Status: ACTIVE | Noted: 2024-09-24

## 2024-09-24 PROBLEM — D22.5 MELANOCYTIC NEVI OF TRUNK: Status: ACTIVE | Noted: 2024-09-24

## 2024-09-24 PROBLEM — D22.71 MELANOCYTIC NEVI OF RIGHT LOWER LIMB, INCLUDING HIP: Status: ACTIVE | Noted: 2024-09-24

## 2024-09-24 PROBLEM — D22.61 MELANOCYTIC NEVI OF RIGHT UPPER LIMB, INCLUDING SHOULDER: Status: ACTIVE | Noted: 2024-09-24

## 2024-09-24 PROCEDURE — OTHER COUNSELING: OTHER

## 2024-09-24 PROCEDURE — OTHER PRESCRIPTION: OTHER

## 2024-09-24 PROCEDURE — OTHER MIPS QUALITY: OTHER

## 2024-09-24 PROCEDURE — 99213 OFFICE O/P EST LOW 20 MIN: CPT

## 2024-09-24 PROCEDURE — OTHER PRESCRIPTION MEDICATION MANAGEMENT: OTHER

## 2024-09-24 RX ORDER — CLOBETASOL PROPIONATE 0.5 MG/G
CREAM TOPICAL
Qty: 60 | Refills: 3 | Status: ERX | COMMUNITY
Start: 2024-09-24

## 2024-09-24 ASSESSMENT — LOCATION DETAILED DESCRIPTION DERM
LOCATION DETAILED: RIGHT PROXIMAL DORSAL FOREARM
LOCATION DETAILED: LEFT MEDIAL SUPERIOR CHEST
LOCATION DETAILED: RIGHT ANTERIOR DISTAL THIGH
LOCATION DETAILED: INFERIOR THORACIC SPINE
LOCATION DETAILED: LEFT ANTERIOR DISTAL THIGH
LOCATION DETAILED: LEFT PROXIMAL DORSAL FOREARM
LOCATION DETAILED: RIGHT DORSAL FOOT
LOCATION DETAILED: RIGHT VENTRAL PROXIMAL FOREARM
LOCATION DETAILED: LEFT INFERIOR CENTRAL MALAR CHEEK
LOCATION DETAILED: RIGHT RADIAL DORSAL HAND
LOCATION DETAILED: LEFT DORSAL FOOT
LOCATION DETAILED: EPIGASTRIC SKIN
LOCATION DETAILED: LEFT ANTERIOR PROXIMAL THIGH
LOCATION DETAILED: LEFT ULNAR DORSAL HAND
LOCATION DETAILED: SUPERIOR THORACIC SPINE
LOCATION DETAILED: RIGHT ANTERIOR PROXIMAL THIGH
LOCATION DETAILED: UPPER STERNUM
LOCATION DETAILED: LEFT ANTERIOR PROXIMAL UPPER ARM
LOCATION DETAILED: LEFT VENTRAL PROXIMAL FOREARM
LOCATION DETAILED: RIGHT ANTERIOR PROXIMAL UPPER ARM

## 2024-09-24 ASSESSMENT — LOCATION SIMPLE DESCRIPTION DERM
LOCATION SIMPLE: ABDOMEN
LOCATION SIMPLE: UPPER BACK
LOCATION SIMPLE: CHEST
LOCATION SIMPLE: LEFT HAND
LOCATION SIMPLE: RIGHT FOOT
LOCATION SIMPLE: RIGHT FOREARM
LOCATION SIMPLE: RIGHT UPPER ARM
LOCATION SIMPLE: LEFT UPPER ARM
LOCATION SIMPLE: RIGHT THIGH
LOCATION SIMPLE: LEFT FOOT
LOCATION SIMPLE: LEFT CHEEK
LOCATION SIMPLE: LEFT FOREARM
LOCATION SIMPLE: LEFT THIGH
LOCATION SIMPLE: RIGHT HAND

## 2024-09-24 ASSESSMENT — PGA PSORIASIS: PGA PSORIASIS 2020: CLEAR

## 2024-09-24 ASSESSMENT — LOCATION ZONE DERM
LOCATION ZONE: TRUNK
LOCATION ZONE: ARM
LOCATION ZONE: FEET
LOCATION ZONE: LEG
LOCATION ZONE: HAND
LOCATION ZONE: FACE

## 2024-09-24 ASSESSMENT — ITCH NUMERIC RATING SCALE: HOW SEVERE IS YOUR ITCHING?: 0

## 2024-09-24 ASSESSMENT — BSA PSORIASIS: % BODY COVERED IN PSORIASIS: 0

## 2024-09-24 NOTE — PROCEDURE: PRESCRIPTION MEDICATION MANAGEMENT
Render In Strict Bullet Format?: No
Detail Level: Zone
Discontinue Regimen: Methotrexate and folic acid from Rheumatologist
Continue Regimen: clobetasol 0.05% topical cream BID as needed with flares

## 2024-09-24 NOTE — PROCEDURE: MIPS QUALITY
Quality 431: Preventive Care And Screening: Unhealthy Alcohol Use - Screening: Patient not identified as an unhealthy alcohol user when screened for unhealthy alcohol use using a systematic screening method
Quality 226: Preventive Care And Screening: Tobacco Use: Screening And Cessation Intervention: Patient screened for tobacco use and is an ex/non-smoker
Quality 130: Documentation Of Current Medications In The Medical Record: Current Medications Documented
Quality 397: Melanoma: Reporting: Pathology report includes the pT Category, thickness, ulceration and mitotic rate, peripheral and deep margin status and presence or absence of microsatellitosis for invasive tumors.
Detail Level: Detailed
Quality 137: Melanoma: Continuity Of Care - Recall System: Patient information entered into a recall system that includes: target date for the next exam specified AND a process to follow up with patients regarding missed or unscheduled appointments
Quality 47: Advance Care Plan: Advance Care Planning discussed and documented in the medical record; patient did not wish or was not able to name a surrogate decision maker or provide an advance care plan.

## 2024-09-26 ENCOUNTER — TELEPHONE (OUTPATIENT)
Facility: CLINIC | Age: 79
End: 2024-09-26

## 2024-09-30 ENCOUNTER — TELEPHONE (OUTPATIENT)
Facility: CLINIC | Age: 79
End: 2024-09-30

## 2024-09-30 NOTE — TELEPHONE ENCOUNTER
Called pt, notified that her labs look good white count platelets kidney and liver are normal   She is slightly anemic at 10.6   I would suggest she get iron studies through her PCP and if she has not had a colonoscopy in the last 10 years would follow-up with primary care physician.     Pt voices understanding of above.   faxed labs to PCP so she can follow-up for further anemia workup    DISPLAY PLAN FREE TEXT

## 2024-11-26 DIAGNOSIS — L40.50 PSORIATIC ARTHRITIS (HCC): ICD-10-CM

## 2024-11-26 DIAGNOSIS — M06.09 RHEUMATOID ARTHRITIS OF MULTIPLE SITES WITHOUT RHEUMATOID FACTOR (HCC): Primary | ICD-10-CM

## 2024-11-26 RX ORDER — LEFLUNOMIDE 20 MG/1
20 TABLET ORAL DAILY
Qty: 90 TABLET | Refills: 0 | Status: SHIPPED | OUTPATIENT
Start: 2024-11-26

## 2024-11-26 NOTE — TELEPHONE ENCOUNTER
Last office visit: 6/11/2024     Next Rheum Apt:12/11/2024 Silvia Lewis MD    Last fill: 6/3/2024   90 tabs, 0 refills   Labs:   No results found for: \"CREATSERUM\", \"GFR\", \"ALKPHO\", \"AST\", \"ALT\", \"BILT\", \"TP\", \"ALB\"    No results found for: \"WBC\", \"HGB\", \"PLT\", \"NEPRELIM\", \"NEUTABS\", \"LYMPHABS\", \"NEUT\", \"LYMPH\", \"NEPERCENT\", \"LYPERCENT\", \"NE\", \"LYMABS\"

## 2024-12-11 ENCOUNTER — OFFICE VISIT (OUTPATIENT)
Dept: RHEUMATOLOGY | Facility: CLINIC | Age: 79
End: 2024-12-11
Payer: MEDICARE

## 2024-12-11 VITALS
HEART RATE: 60 BPM | SYSTOLIC BLOOD PRESSURE: 118 MMHG | DIASTOLIC BLOOD PRESSURE: 70 MMHG | WEIGHT: 193 LBS | OXYGEN SATURATION: 99 % | TEMPERATURE: 98 F | BODY MASS INDEX: 30.29 KG/M2 | RESPIRATION RATE: 16 BRPM | HEIGHT: 67 IN

## 2024-12-11 DIAGNOSIS — Z79.899 ENCOUNTER FOR DRUG THERAPY: ICD-10-CM

## 2024-12-11 DIAGNOSIS — D84.9 IMMUNOCOMPROMISED STATE (HCC): ICD-10-CM

## 2024-12-11 DIAGNOSIS — E27.40 CHRONIC ADRENAL INSUFFICIENCY (HCC): Primary | ICD-10-CM

## 2024-12-11 DIAGNOSIS — M06.09 RHEUMATOID ARTHRITIS OF MULTIPLE SITES WITHOUT RHEUMATOID FACTOR (HCC): ICD-10-CM

## 2024-12-11 DIAGNOSIS — M15.0 PRIMARY OSTEOARTHRITIS INVOLVING MULTIPLE JOINTS: ICD-10-CM

## 2024-12-11 DIAGNOSIS — L40.50 PSORIATIC ARTHRITIS (HCC): ICD-10-CM

## 2024-12-11 DIAGNOSIS — Z85.820 HISTORY OF MELANOMA: ICD-10-CM

## 2024-12-11 PROCEDURE — G2211 COMPLEX E/M VISIT ADD ON: HCPCS | Performed by: INTERNAL MEDICINE

## 2024-12-11 PROCEDURE — 99214 OFFICE O/P EST MOD 30 MIN: CPT | Performed by: INTERNAL MEDICINE

## 2024-12-11 NOTE — PROGRESS NOTES
[facility], [address] [place] [address]      Consult     Karmen Joseph Patient Status:  No patient class for patient encounter    1945 MRN [id number]   Location [facility], [address] [place] [address] Attending No att. providers found   Hosp Day # 0 PCP RAFITA MATA     Referring Provider: PCP    Reason for Consultation: Seronegative RA/psoriatic arthritis    Subjective:    Karmen Joseph is a 79 year old female with     Reason for Consult: history of seronegative rheumatoid arthritis? Psoriasis    HPI: 79-year-old woman comes in for further evaluation for worsening joint pain and stiffness swelling initially started 2021. She was seen by rheumatologist who suggested possibility likely of seronegative rheumatoid arthritis with normal CCP. There is no x-rays that were done at the time she was initially started on hydroxychloroquine and 2021 and then progress to treatment with methotrexate because of lack of response up to 15 mg a week. 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    Her rheumatologist at that point because of pharmacy stating that they cannot place both hydrocortisone and prednisone together stopped prednisone and all her medications including methotrexate and hydroxychloroquine    Patient states significant swelling and also noticed 4 months into treatment with hydroxychloroquine and 1-2 weeks on treatment with methotrexate developing psoriasis on her lower extremities and small patches and also a few areas on her scalp.    She was last seen in clinic 2022 as a new patient  We had diagnosed her with likelihood of psoriatic arthritis with significant psoriasis and polyarthritis  We have given her Depo Medrol 80 mg subcutaneous injection with prednisone and taper down to 10 mg daily  We had discussed addition of Biologics and after  going back and forth with her dermatologist we initially tried stellara but her insurance denied this.  Because of her history of malignant melanoma and skin cancer we are reluctant to consider TNF treatment that we have discussed risks versus benefit at this time and they decided to proceed with cimzia with close monitoring with her dermatologist revolving Skin cancers  Potential risks and side effects discussed in detail today  Unfortunately other Biologics that we have discussed her not being covered  Long-term risk of steroids discussed.    She was last seen in clinic June 2024    Patient was off steroids and doing well on Cimzia injections    Previously stopped sulfasalazine because of no benefit    Has no further stiffness or pain that is concerning at the ankles or feet.     Continued on leflunomide 20 mg daily which she states she has cut down to 10 mg daily    Per patient's daughter closer to her infusion injection of Cimzia she does get mild stiffness and swelling that is more annoying and bothersome.    She has remained off steroids.  They do attribute her blood pressure fluctuations to her [place] and is followed by endocrinology    Patient aches and pains in the neck and back but not too problematic they are not interested in proceeding with imaging is open to physical therapy for strengthening    Her blood pressure has been overall stable and is followed by cardiology and endocrinology    Did have 1 hospitalization January 2024 for blood pressure fluctuation but otherwise doing well    Previously in December 2022 she also stopped hydrochlorothiazide because of problems with dehydration and hyponatremia with her infections and her cardiologist had added low-dose Norvasc 5 mg and an ARB within the last few weeks    She is seeing cardiology and endocrinology today    The endocrinologist does thinks the fluctuations in her blood pressure could also 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 She states the blood pressure was a slight problem even before and possibility of being off and taken off the hydrochlorothiazide is likely reason    She is on antibiotic prophylaxis at this time for recurrent UTIs    She has not had an infection at this time    She has no symptoms at this time    Risks and side effects Biologics discussed in detail.    TB testing was normal August 2023    Patient and patient's daughter agree with this plan    Her recent DEXA scan is normal. She had side effects to Fosamax and stopped    We will need to get the last bone density send new orders accordingly    She would like to continue Citracal twice a day for now    She has not had any falls or fractures       History/Other:        Past Medical History:  Past Medical History:    Essential hypertension    Hypothyroid        Past Surgical History:   Past Surgical History:   Procedure Laterality Date    Cyst removal  2012    from lower back    Tonsillectomy         Social History:  reports that she has never smoked. She does not have any smokeless tobacco history on file. She reports current alcohol use. She reports that she does not use drugs.    Family History:   Family History   Problem Relation Age of Onset    Heart Disorder Father     Hypertension Father     Other (Other[other]) Mother     Cancer Brother         throat     Diabetes Brother        Allergies:   Allergies   Allergen Reactions    Ciprofloxacin OTHER (SEE COMMENTS)     Back tingling sensation and burning feeling inside of body.    Back tingling sensation and burning feeling inside of body.   Back tingling sensation and burning feeling inside of body.    Back tingling sensation and burning feeling inside of body.   Back tingling sensation  and burning feeling inside of body.   Back tingling sensation and burning feeling inside of body.   Back tingling sensation and burning feeling inside of body.      Other reaction(s): N+V    Tramadol OTHER (SEE COMMENTS)     Sick to the stomach    Trazodone OTHER (SEE COMMENTS)     Cough, felt \"funny\"    Cough, felt \"funny\"   Cough, felt \"funny\"    Cough, felt \"funny\"   Cough, felt \"funny\"   Cough, felt \"funny\"   Cough, felt \"funny\"      Other reaction(s): GI upset       Current Medications:  Current Outpatient Medications   Medication Sig Dispense Refill    LEFLUNOMIDE 20 MG Oral Tab TAKE 1 TABLET BY MOUTH EVERY DAY (Patient taking differently: Take 1 tablet (20 mg total) by mouth daily. Patient is taking a 1/2 tablet 10 mg) 90 tablet 0    amLODIPine 5 MG Oral Tab Take 2 tablets (10 mg total) by mouth daily.      carvedilol 12.5 MG Oral Tab Take 1 tablet (12.5 mg total) by mouth 2 (two) times daily with meals.      cephalexin 250 MG Oral Cap Take 1 capsule (250 mg total) by mouth every other day.      Certolizumab Pegol (CIMZIA STARTER KIT) 6 X 200 MG/ML Subcutaneous Prefilled Syringe Kit Inject 400 mg into the skin.      enalapril 10 MG Oral Tab Take 1 tablet (10 mg total) by mouth 2 (two) times daily.      fludrocortisone 0.1 MG Oral Tab Take 1 tablet (0.1 mg total) by mouth daily.      levothyroxine 112 MCG Oral Tab Take 1 tablet (112 mcg total) by mouth before breakfast.      hydrocortisone 5 MG Oral Tab Take 3 tablets by mouth in the AM and 2 tablets in the afternoon. Maximum 200 tablets daily for stress dose 550 tablet 1    aspirin 81 MG Oral Tab Take 1 tablet (81 mg total) by mouth daily.      Multiple Vitamins-Minerals (CENTRUM SILVER) Oral Tab Take 1 tablet by mouth daily.      Omega-3 Fatty Acids (FISH OIL OR) Take by mouth daily.      Calcium Carbonate-Vitamin D (CALCIUM + D OR) Take by mouth 2 (two) times daily.      Amylase-Lipase-Protease (DIGESTIVE ENZYMES OR) Take by mouth daily.        No current  facility-administered medications for this visit.       (Not in a hospital admission)      Review of Systems:     Constitutional: Negative for chills, , fatigue, fever and unexpected weight change.    HENT: Negative for congestion, and mouth sores.    Eyes: Negative for photophobia, pain, redness and visual disturbance.    Respiratory: Negative for apnea, cough, chest tightness, shortness of breath, wheezing and stridor.    Cardiovascular: Negative for chest pain, palpitations and leg swelling.    Gastrointestinal: Negative for abdominal distention, abdominal pain, blood in stool, constipation, diarrhea and nausea.    Endocrine: Negative.     Genitourinary: Negative for decreased urine volume, difficulty urinating, dyspareunia, dysuria, flank pain, and frequency.    Musculoskeletal: Negative for arthralgias, gait problem and joint swelling.    Skin: Negative for color change, pallor and rash. No raynauds or digital ulcerations no sclerodactly.    Allergic/Immunologic: Negative.    Neurological: Negative for dizziness, tremors, seizures, syncope, speech difficulty, weakness, light-headedness, numbness and headaches.    Hematological: Does not bruise/bleed easily.    Psychiatric/Behavioral: Negative for confusion, decreased concentration, hallucinations, self-injury, sleep disturbance and suicidal ideas or depression.    Objective:   [unfilled]  Vitals:    12/11/24 1213   BP: 118/70   Pulse: 60   Resp: 16   Temp: 97.8 °F (36.6 °C)            Constitutional: is oriented to person, place, and time. Appears well-developed and well-nourished. No distress.    HEENT: Normocephalic; EOMI; no jvd; no LAD; no oral or nasal ulcers.     Eyes: Conjunctivae and EOM are normal. Pupils are equal, round, and reactive to light.     Neck: Normal range of motion. No thyromegaly present.    Cardiovascular: RRR, no murmurs.    Lungs: Clear, Bilateral air entry, no wheezes.    Abdominal: Soft.    Musculoskeletal:    There is  currently no information documented on the homunculus. Go to the Rheumatology activity and complete the [facility] joint exam.     Joint Exam 12/11/2024     No joint exam has been documented for this visit        Swollen: --     Tender: --         Right shoulder: Exhibits normal range of motion on abduction and internal rotation, no tenderness, no bony tenderness, no deformity, no laceration, no pain and no spasm.        Left shoulder: Exhibits normal range of motion on abduction and internal and external rotation.  no tenderness, no bony tenderness, no swelling, no effusion, no deformity, no pain, no spasm and normal strength.        Right elbow:  Exhibits normal range of motion, no swelling, no effusion and no deformity. No tenderness found. No medial epicondyle, no lateral epicondyle and no olecranon process tenderness noted. There are no contractures or tophi or nodules.        Left elbow:  Normal range of motion, no swelling, no effusion and no deformity. No medial epicondyle, no lateral epicondyle and no olecranon process tenderness noted. There are no contractures or tophi or nodules.        Right wrist:  Exhibits normal range of motion, no tenderness, no bony tenderness, no swelling, no effusion and no crepitus. Flexion and extension intact w/o limitation.        Left wrist: Exhibits normal range of motion, no tenderness, no bony tenderness, no swelling, no effusion, no crepitus and no deformity. Flexion and extension intact without limitation.        Right hip: Exhibits normal range of motion, normal strength, no tenderness, no bony tenderness, no swelling and no crepitus.        Right hand: No synovitis of DIP joints; there are scattered Bouchards or Heberden nodules noted;  strength: 95%.        Left hand: No synovitis of  DIP joints; there are scattered Bouchards or Heberden nodules noted;  strength: 95%.        Left hip: Exhibits normal range of motion, normal strength, no tenderness, no bony  tenderness, No swelling and no crepitus.        Right knee: Exhibits normal range of motion, no swelling, no effusion, no ecchymosis, no deformity and no erythema. No tenderness found. No medial joint line, no lateral joint line, no MCL and no LCL tenderness noted. No crepitation on flexion of knee and extension normal.        Left knee:  Exhibits normal range of motion, no swelling, no effusion, no ecchymosis and no erythema. No tenderness found. No medial joint line, no lateral joint line and no patellar tendon tenderness noted. No crepitation on flexion of the knee. Extension intact and normal.        Right ankle: mild swelling, no deformity. No tenderness. Dorsiflexion and plantar flexion intact without limitation in range of motion.        Left ankle: Exhibits mild swelling. No tenderness. No lateral malleolus and no medial malleolus tenderness found. Achilles tendon normal. Achilles tendon exhibits no pain, no defect and normal Chan's test results.  Dorsiflexion and plantar flexion intact without limitation in range of motion.        Cervical back: Exhibits normal range of motion, no tenderness, no bony tenderness, no swelling, no pain and no spasm.        Thoracic back: Exhibits normal range of motion, no tenderness, no bony tenderness and no spasm.        Lumbar back:  Exhibits normal range of motion, no tenderness, no bony tenderness, no pain and no spasm.        Right foot: normal. There is normal range of motion, no tenderness, no bony tenderness, no crepitus and no laceration. There is no synovitis or tenderness of the MTP joints to palpation.  Enlargement of the MTP joint        Left foot: normal. There is normal range of motion, no tenderness, no bony tenderness and no crepitus. There is no synovitis or tenderness of the MTP joints to palpation.  No synovitis    Lymphadenopathy: No submental, no submandibular, and no occipital adenopathy present, has no cervical adenopathy or axillary  lympadenopathy.    Neurological: Alert and oriented. No focal motor or sensory abnormalities. Strength is 5/5 Upper Extremities/Lower Extremities proximally and distally.    Skin: Skin is warm, dry and intact.    Psychiatric: Normal behavior.    Routine Assessment of Patient Index Data       1. Please check the one that best answers the patients abilities at this time:  A. Dress yourself, including tying shoelaces and doing buttons?: With some difficulty Dress Raw Score: 1     B. Get in and out of bed?: Without any difficulty Bed Raw Score: 0     C. Lift a full cup or glass to your mouth?: Without any difficulty Cup Raw Score: 0     D. Walk outdoors on flat ground?: With much difficulty Walk Raw Score: 2     E. Wash and dry your entire body?: With some difficulty Bathe Raw Score: 1     F. Bend down to  clothing from the floor?: Without any difficulty Bend Raw Score: 0     G. Turn regular faucets on and off?: Without any difficulty Faucet Raw Score: 0     H. Get in and out of a car, bus, train, or airplane?: With some difficulty Vehicle Raw Score: 1     I. Walk two miles or three kilometers, if you wish?: Unable to do Miles Raw Score: 3     J. Participate in recreational activities and sports as you would like, if you wish?: Unable to do Participate Raw Score: 3     Functional Status (FN) Pre-Score - Rows A-J: 11 A-J FN Converted Score: 3.7       K. Get a good night's sleep?: With much difficulty     L. Deal with feelings of anxiety or being nervous?: With some difficulty     M. Deal with feelings of depression or feeling blue?: With some difficulty         2. How much pain have you had because of your condition over the past week?:  Patient's Pain Tolerance (PN): 4.5         3. Considering all the ways in which illness and health conditions may affect you at this time, how are you doing?:  Patient's Global Estimate (PTGE): 4.5         RAPID 3 Score and Severity:  RAPID 3 Cumulative Score: 12.7 Severity:  Moderate Severity                   Results:    Labs:      No results found for: \"WBC\", \"RBC\", \"HGB\", \"HCT\", \"MCV\", \"MCH\", \"MCHC\", \"RDW\", \"PLT\", \"MPV\", \"LYMPHOCYTES\", \"NEUT\"    No components found for: \"RELY\", \"NMET\", \"MYEL\", \"PROMY\", \"JORDEN\", \"ABSNEUTS\", \"ABSBANDS\", \"ABMM\", \"ABMY\", \"ABPM\", \"ABBL\"      No results found for: \"NA\", \"K\", \"CHLORIDE\", \"CO2\", \"BUN\", \"CREAT\", \"GFR\", \"CALCIUM\", \"ALB\", \"ALKPHOS\", \"AST\", \"ALT\"       No components found for: \"ESRWESTERGRN\"       No results found for: \"CRP\"      No results found for: \"COLOR\", \"CLARITY\", \"UROBILINOGEN\", \"YEAST\"  @LABRCNTIP(RF,B12)@      [unfilled]    [facility] CMP normal November 2023    Imaging:  No results found.    Assessment & Plan:      79-year-old woman comes in for further evaluation for 6-8 month history of worsening polyarthritis with previous diagnosis of seronegative rheumatoid arthritis    Suspect psoriasis and psoriatic arthritis with significant synovitis dactylitis digital nail pitting and oncho lysis of the nailbeds  Moderate osteoarthritis multiple joint  On prophylaxis for recurrent UTIs by urology  Hypertension unclear etiology consider being taken off hydrochlorothiazide?  Less likely related to injection with improvement on new regimen    No further synovitis on exam  Continue Cimzia injections  Patient's daughter and patient state likely because they are overdue on injection by 1 to 2 weeks.  Not interested in switching treatment or going on steroids  Cimzia injection is scheduled for next week  Patient cut down leflunomide to 10 mg daily on her own no diarrhea she stated it made her feel funny and possibly fluctuated her blood pressure she is stable on the lower dose  Labs stable December 2024  Remain off sulfasalazine  Wean off steroids because of significant fluctuations in blood pressure and Medina's.  Now on midodrine from endocrinology with stabilization of her blood pressure followed by a another cardiologist as well  Continue Cimzia  injections     Other option includes orencia    Potential risks and side effects of TNF treatment and CIMZIA discussed in detail    Continue hydrocortisone 2 PCP for adrenal insufficiency and Chuckie's    He is now followed by endocrinology and the new cardiologist    DEXA scan normal. Patient had side effects to Fosamax will hold for now.  We will need to obtain the date of the last DEXA scan.  Will have her sign a release from [facility] today    She declines any prophylaxis for now. For now continue Citracal twice a day.    Patient and patient's daughter also present agree with this plan    Autoimmune testing unrevealing    repeat urinalysis today     Reviewed results of x-rays showing degenerative osteoarthritis    Potential risks and side effects of Biologics discussed in detail and we'll update chest x-ray and TB testing and send orders    Unfortunately cosentyx and stellara were not covered with her insurance     Requesting another physical therapy order for occasional back pain stiffness not interested in obtaining imaging of the spine    Return to clinic:  Return in about 6 months (around 6/11/2025).    Silvia Lewis MD  8/24/2023

## 2024-12-26 ENCOUNTER — TELEPHONE (OUTPATIENT)
Facility: CLINIC | Age: 79
End: 2024-12-26

## 2024-12-30 NOTE — TELEPHONE ENCOUNTER
Pt called office to inquire about her cimzia, I expressed to her that Dr. Lewis is out of office until 1/6/25 and orders are on her desk to sign. Pt voiced understanding.

## 2025-02-22 DIAGNOSIS — M06.09 RHEUMATOID ARTHRITIS OF MULTIPLE SITES WITHOUT RHEUMATOID FACTOR (HCC): ICD-10-CM

## 2025-02-22 DIAGNOSIS — L40.50 PSORIATIC ARTHRITIS (HCC): ICD-10-CM

## 2025-02-24 RX ORDER — LEFLUNOMIDE 10 MG/1
10 TABLET ORAL DAILY
Qty: 90 TABLET | Refills: 0 | Status: SHIPPED | OUTPATIENT
Start: 2025-02-24

## 2025-02-24 NOTE — TELEPHONE ENCOUNTER
Spoke to patient regarding the below message:    Please let patient know she is due for labs next month but I will okay the 90-day prescription     Patient verbalized understanding and denied any questions at this time. She states that she is scheduled for labs on March 1st.

## 2025-02-24 NOTE — TELEPHONE ENCOUNTER
LOV: 12/11/2024    Future Appointments   Date Time Provider Department Center   6/26/2025 12:00 PM Silvia Lewis MD [id number] [address]       LF: 11/26/2024    QTY: 90    Refills: 0    LABS: Scanned under the lab tab on 12/10/2024 and DRAWN on 12/05/2024

## 2025-03-11 ENCOUNTER — TELEPHONE (OUTPATIENT)
Facility: CLINIC | Age: 80
End: 2025-03-11

## 2025-03-11 NOTE — TELEPHONE ENCOUNTER
MCM sent to pt stating she will need to call that hospital location to obtain results and for them to be faxed. Awaiting possible response.

## 2025-03-14 ENCOUNTER — APPOINTMENT (OUTPATIENT)
Dept: URBAN - METROPOLITAN AREA CLINIC 241 | Age: 80
Setting detail: DERMATOLOGY
End: 2025-03-16

## 2025-03-14 DIAGNOSIS — L57.8 OTHER SKIN CHANGES DUE TO CHRONIC EXPOSURE TO NONIONIZING RADIATION: ICD-10-CM

## 2025-03-14 DIAGNOSIS — D492 NEOPLASM OF UNSPECIFIED NATURE OF BONE, SOFT TISSUE, AND SKIN: ICD-10-CM

## 2025-03-14 DIAGNOSIS — Z85.820 PERSONAL HISTORY OF MALIGNANT MELANOMA OF SKIN: ICD-10-CM

## 2025-03-14 DIAGNOSIS — L85.3 XEROSIS CUTIS: ICD-10-CM

## 2025-03-14 DIAGNOSIS — L40.0 PSORIASIS VULGARIS: ICD-10-CM

## 2025-03-14 DIAGNOSIS — D22 MELANOCYTIC NEVI: ICD-10-CM

## 2025-03-14 DIAGNOSIS — Z85.828 PERSONAL HISTORY OF OTHER MALIGNANT NEOPLASM OF SKIN: ICD-10-CM

## 2025-03-14 DIAGNOSIS — Z71.89 OTHER SPECIFIED COUNSELING: ICD-10-CM

## 2025-03-14 DIAGNOSIS — Z87.2 PERSONAL HISTORY OF DISEASES OF THE SKIN AND SUBCUTANEOUS TISSUE: ICD-10-CM

## 2025-03-14 DIAGNOSIS — L82.1 OTHER SEBORRHEIC KERATOSIS: ICD-10-CM

## 2025-03-14 DIAGNOSIS — D18.0 HEMANGIOMA: ICD-10-CM

## 2025-03-14 PROBLEM — D22.5 MELANOCYTIC NEVI OF TRUNK: Status: ACTIVE | Noted: 2025-03-14

## 2025-03-14 PROBLEM — D22.61 MELANOCYTIC NEVI OF RIGHT UPPER LIMB, INCLUDING SHOULDER: Status: ACTIVE | Noted: 2025-03-14

## 2025-03-14 PROBLEM — D18.01 HEMANGIOMA OF SKIN AND SUBCUTANEOUS TISSUE: Status: ACTIVE | Noted: 2025-03-14

## 2025-03-14 PROBLEM — D22.72 MELANOCYTIC NEVI OF LEFT LOWER LIMB, INCLUDING HIP: Status: ACTIVE | Noted: 2025-03-14

## 2025-03-14 PROBLEM — D22.62 MELANOCYTIC NEVI OF LEFT UPPER LIMB, INCLUDING SHOULDER: Status: ACTIVE | Noted: 2025-03-14

## 2025-03-14 PROBLEM — D22.71 MELANOCYTIC NEVI OF RIGHT LOWER LIMB, INCLUDING HIP: Status: ACTIVE | Noted: 2025-03-14

## 2025-03-14 PROBLEM — R22.1 LOCALIZED SWELLING, MASS AND LUMP, NECK: Status: ACTIVE | Noted: 2025-03-14

## 2025-03-14 PROCEDURE — 99213 OFFICE O/P EST LOW 20 MIN: CPT

## 2025-03-14 PROCEDURE — OTHER MIPS QUALITY: OTHER

## 2025-03-14 PROCEDURE — OTHER MEDICATION COUNSELING: OTHER

## 2025-03-14 PROCEDURE — OTHER PRESCRIPTION MEDICATION MANAGEMENT: OTHER

## 2025-03-14 PROCEDURE — OTHER COUNSELING: OTHER

## 2025-03-14 ASSESSMENT — LOCATION DETAILED DESCRIPTION DERM
LOCATION DETAILED: RIGHT VENTRAL PROXIMAL FOREARM
LOCATION DETAILED: LEFT ANTERIOR PROXIMAL UPPER ARM
LOCATION DETAILED: INFERIOR THORACIC SPINE
LOCATION DETAILED: EPIGASTRIC SKIN
LOCATION DETAILED: RIGHT ANTERIOR PROXIMAL THIGH
LOCATION DETAILED: RIGHT DORSAL FOOT
LOCATION DETAILED: RIGHT PROXIMAL DORSAL FOREARM
LOCATION DETAILED: MID POSTERIOR NECK
LOCATION DETAILED: LEFT PROXIMAL DORSAL FOREARM
LOCATION DETAILED: UPPER STERNUM
LOCATION DETAILED: RIGHT ANTERIOR PROXIMAL UPPER ARM
LOCATION DETAILED: LEFT ANTERIOR PROXIMAL THIGH
LOCATION DETAILED: RIGHT ANTERIOR DISTAL THIGH
LOCATION DETAILED: RIGHT RADIAL DORSAL HAND
LOCATION DETAILED: LEFT MEDIAL SUPERIOR CHEST
LOCATION DETAILED: LEFT ULNAR DORSAL HAND
LOCATION DETAILED: LEFT VENTRAL PROXIMAL FOREARM
LOCATION DETAILED: SUPERIOR THORACIC SPINE
LOCATION DETAILED: LEFT INFERIOR CENTRAL MALAR CHEEK
LOCATION DETAILED: LEFT DORSAL FOOT
LOCATION DETAILED: LEFT ANTERIOR DISTAL THIGH

## 2025-03-14 ASSESSMENT — LOCATION ZONE DERM
LOCATION ZONE: LEG
LOCATION ZONE: FEET
LOCATION ZONE: FACE
LOCATION ZONE: HAND
LOCATION ZONE: ARM
LOCATION ZONE: TRUNK
LOCATION ZONE: NECK

## 2025-03-14 ASSESSMENT — LOCATION SIMPLE DESCRIPTION DERM
LOCATION SIMPLE: POSTERIOR NECK
LOCATION SIMPLE: RIGHT UPPER ARM
LOCATION SIMPLE: ABDOMEN
LOCATION SIMPLE: RIGHT FOOT
LOCATION SIMPLE: LEFT UPPER ARM
LOCATION SIMPLE: LEFT CHEEK
LOCATION SIMPLE: RIGHT HAND
LOCATION SIMPLE: LEFT FOREARM
LOCATION SIMPLE: LEFT THIGH
LOCATION SIMPLE: LEFT HAND
LOCATION SIMPLE: CHEST
LOCATION SIMPLE: RIGHT THIGH
LOCATION SIMPLE: RIGHT FOREARM
LOCATION SIMPLE: LEFT FOOT
LOCATION SIMPLE: UPPER BACK

## 2025-03-14 NOTE — PROCEDURE: MEDICATION COUNSELING
Albendazole Counseling:  I discussed with the patient the risks of albendazole including but not limited to cytopenia, kidney damage, nausea/vomiting and severe allergy.  The patient understands that this medication is being used in an off-label manner.
Cimzia Pregnancy And Lactation Text: This medication crosses the placenta but can be considered safe in certain situations. Cimzia may be excreted in breast milk.
Olumiant Pregnancy And Lactation Text: Based on animal studies, Olumiant may cause embryo-fetal harm when administered to pregnant women.  The medication should not be used in pregnancy.  Breastfeeding is not recommended during treatment.
Glycopyrrolate Counseling:  I discussed with the patient the risks of glycopyrrolate including but not limited to skin rash, drowsiness, dry mouth, difficulty urinating, and blurred vision.
Enbrel Counseling:  I discussed with the patient the risks of etanercept including but not limited to myelosuppression, immunosuppression, autoimmune hepatitis, demyelinating diseases, lymphoma, and infections.  The patient understands that monitoring is required including a PPD at baseline and must alert us or the primary physician if symptoms of infection or other concerning signs are noted.
Aklief counseling:  Patient advised to apply a pea-sized amount only at bedtime and wait 30 minutes after washing their face before applying.  If too drying, patient may add a non-comedogenic moisturizer.  The most commonly reported side effects including irritation, redness, scaling, dryness, stinging, burning, itching, and increased risk of sunburn.  The patient verbalized understanding of the proper use and possible adverse effects of retinoids.  All of the patient's questions and concerns were addressed.
Topical Retinoid counseling:  Patient advised to apply a pea-sized amount only at bedtime and wait 30 minutes after washing their face before applying.  If too drying, patient may add a non-comedogenic moisturizer. The patient verbalized understanding of the proper use and possible adverse effects of retinoids.  All of the patient's questions and concerns were addressed.
Include Pregnancy/Lactation Warning?: No
Vtama Pregnancy And Lactation Text: It is unknown if this medication can cause problems during pregnancy and breastfeeding.
Albendazole Pregnancy And Lactation Text: This medication is Pregnancy Category C and it isn't known if it is safe during pregnancy. It is also excreted in breast milk.
Hydroquinone Counseling:  Patient advised that medication may result in skin irritation, lightening (hypopigmentation), dryness, and burning.  In the event of skin irritation, the patient was advised to reduce the amount of the drug applied or use it less frequently.  Rarely, spots that are treated with hydroquinone can become darker (pseudoochronosis).  Should this occur, patient instructed to stop medication and call the office. The patient verbalized understanding of the proper use and possible adverse effects of hydroquinone.  All of the patient's questions and concerns were addressed.
Simponi Pregnancy And Lactation Text: The risk during pregnancy and breastfeeding is uncertain with this medication.
Topical Retinoid Pregnancy And Lactation Text: This medication is Pregnancy Category C. It is unknown if this medication is excreted in breast milk.
Aklief Pregnancy And Lactation Text: It is unknown if this medication is safe to use during pregnancy.  It is unknown if this medication is excreted in breast milk.  Breastfeeding women should use the topical cream on the smallest area of the skin for the shortest time needed while breastfeeding.  Do not apply to nipple and areola.
Rinvoq Counseling: I discussed with the patient the risks of Rinvoq therapy including but not limited to upper respiratory tract infections, shingles, cold sores, bronchitis, nausea, cough, fever, acne, and headache. Live vaccines should be avoided.  This medication has been linked to serious infections; higher rate of mortality; malignancy and lymphoproliferative disorders; major adverse cardiovascular events; thrombosis; thrombocytopenia, anemia, and neutropenia; lipid elevations; liver enzyme elevations; and gastrointestinal perforations.
Cosentyx Counseling:  I discussed with the patient the risks of Cosentyx including but not limited to worsening of Crohn's disease, immunosuppression, allergic reactions and infections.  The patient understands that monitoring is required including a PPD at baseline and must alert us or the primary physician if symptoms of infection or other concerning signs are noted.
Enbrel Pregnancy And Lactation Text: This medication is Pregnancy Category B and is considered safe during pregnancy. It is unknown if this medication is excreted in breast milk.
Cantharidin Pregnancy And Lactation Text: This medication has not been proven safe during pregnancy. It is unknown if this medication is excreted in breast milk.
Glycopyrrolate Pregnancy And Lactation Text: This medication is Pregnancy Category B and is considered safe during pregnancy. It is unknown if it is excreted breast milk.
Azelaic Acid Counseling: Patient counseled that medicine may cause skin irritation and to avoid applying near the eyes.  In the event of skin irritation, the patient was advised to reduce the amount of the drug applied or use it less frequently.   The patient verbalized understanding of the proper use and possible adverse effects of azelaic acid.  All of the patient's questions and concerns were addressed.
Hydroquinone Pregnancy And Lactation Text: This medication has not been assigned a Pregnancy Risk Category but animal studies failed to show danger with the topical medication. It is unknown if the medication is excreted in breast milk.
Ivermectin Counseling:  Patient instructed to take medication on an empty stomach with a full glass of water.  Patient informed of potential adverse effects including but not limited to nausea, diarrhea, dizziness, itching, and swelling of the extremities or lymph nodes.  The patient verbalized understanding of the proper use and possible adverse effects of ivermectin.  All of the patient's questions and concerns were addressed.
Skyrizi Counseling: I discussed with the patient the risks of risankizumab-rzaa including but not limited to immunosuppression, and serious infections.  The patient understands that monitoring is required including a PPD at baseline and must alert us or the primary physician if symptoms of infection or other concerning signs are noted.
Zoryve Counseling:  I discussed with the patient that Zoryve is not for use in the eyes, mouth or vagina. The most commonly reported side effects include diarrhea, headache, insomnia, application site pain, upper respiratory tract infections, and urinary tract infections.  All of the patient's questions and concerns were addressed.
Humira Counseling:  I discussed with the patient the risks of adalimumab including but not limited to myelosuppression, immunosuppression, autoimmune hepatitis, demyelinating diseases, lymphoma, and serious infections.  The patient understands that monitoring is required including a PPD at baseline and must alert us or the primary physician if symptoms of infection or other concerning signs are noted.
Tazorac Counseling:  Patient advised that medication is irritating and drying.  Patient may need to apply sparingly and wash off after an hour before eventually leaving it on overnight.  The patient verbalized understanding of the proper use and possible adverse effects of tazorac.  All of the patient's questions and concerns were addressed.
Dutasteride Male Counseling: Dustasteride Counseling:  I discussed with the patient the risks of use of dutasteride including but not limited to decreased libido, decreased ejaculate volume, and gynecomastia. Women who can become pregnant should not handle medication.  All of the patient's questions and concerns were addressed.
Rinvoq Pregnancy And Lactation Text: Based on animal studies, Rinvoq may cause embryo-fetal harm when administered to pregnant women.  The medication should not be used in pregnancy.  Breastfeeding is not recommended during treatment and for 6 days after the last dose.
Hydroxychloroquine Counseling:  I discussed with the patient that a baseline ophthalmologic exam is needed at the start of therapy and every year thereafter while on therapy. A CBC may also be warranted for monitoring.  The side effects of this medication were discussed with the patient, including but not limited to agranulocytosis, aplastic anemia, seizures, rashes, retinopathy, and liver toxicity. Patient instructed to call the office should any adverse effect occur.  The patient verbalized understanding of the proper use and possible adverse effects of Plaquenil.  All the patient's questions and concerns were addressed.
Imiquimod Counseling:  I discussed with the patient the risks of imiquimod including but not limited to erythema, scaling, itching, weeping, crusting, and pain.  Patient understands that the inflammatory response to imiquimod is variable from person to person and was educated regarded proper titration schedule.  If flu-like symptoms develop, patient knows to discontinue the medication and contact us.
Prednisone Pregnancy And Lactation Text: This medication is Pregnancy Category C and it isn't know if it is safe during pregnancy. This medication is excreted in breast milk.
Cimetidine Counseling:  I discussed with the patient the risks of Cimetidine including but not limited to gynecomastia, headache, diarrhea, nausea, drowsiness, arrhythmias, pancreatitis, skin rashes, psychosis, bone marrow suppression and kidney toxicity.
Otezla Counseling: The side effects of Otezla were discussed with the patient, including but not limited to worsening or new depression, weight loss, diarrhea, nausea, upper respiratory tract infection, and headache. Patient instructed to call the office should any adverse effect occur.  The patient verbalized understanding of the proper use and possible adverse effects of Otezla.  All the patient's questions and concerns were addressed.
Dupixent Counseling: I discussed with the patient the risks of dupilumab including but not limited to eye inflammation and irritation, cold sores, injection site reactions, allergic reactions and increased risk of parasitic infection. The patient understands that monitoring is required and they must alert us or the primary physician if symptoms of infection or other concerning signs are noted.
Dutasteride Female Counseling: Dutasteride Counseling:  I discussed with the patient the risks of use of dutasteride including but not limited to decreased libido and sexual dysfunction. Explained the teratogenic nature of the medication and stressed the importance of not getting pregnant during treatment. All of the patient's questions and concerns were addressed.
Azelaic Acid Pregnancy And Lactation Text: This medication is considered safe during pregnancy and breast feeding.
Tazorac Pregnancy And Lactation Text: This medication is not safe during pregnancy. It is unknown if this medication is excreted in breast milk.
Xeljanz Counseling: I discussed with the patient the risks of Xeljanz therapy including increased risk of infection, liver issues, headache, diarrhea, or cold symptoms. Live vaccines should be avoided. They were instructed to call if they have any problems.
Azathioprine Counseling:  I discussed with the patient the risks of azathioprine including but not limited to myelosuppression, immunosuppression, hepatotoxicity, lymphoma, and infections.  The patient understands that monitoring is required including baseline LFTs, Creatinine, possible TPMP genotyping and weekly CBCs for the first month and then every 2 weeks thereafter.  The patient verbalized understanding of the proper use and possible adverse effects of azathioprine.  All of the patient's questions and concerns were addressed.
Hydroxychloroquine Pregnancy And Lactation Text: This medication has been shown to cause fetal harm but it isn't assigned a Pregnancy Risk Category. There are small amounts excreted in breast milk.
Zyclara Counseling:  I discussed with the patient the risks of imiquimod including but not limited to erythema, scaling, itching, weeping, crusting, and pain.  Patient understands that the inflammatory response to imiquimod is variable from person to person and was educated regarded proper titration schedule.  If flu-like symptoms develop, patient knows to discontinue the medication and contact us.
Cimetidine Pregnancy And Lactation Text: This medication is Pregnancy Category B and is considered safe during pregnancy. It is also excreted in breast milk and breast feeding isn't recommended.
Spevigo Counseling: I discussed with the patient the risks of Spevigo including but not limited to fatigue, nasuea, vomiting, headache, pruritus, urinary tract infection, an infusion related reactions.  The patient understands that monitoring is required including screening for tuberculosis at baseline and yearly screening thereafter while continuing Spevigo therapy. They should contact us if symptoms of infection or other concerning signs are noted.
Azithromycin Counseling:  I discussed with the patient the risks of azithromycin including but not limited to GI upset, allergic reaction, drug rash, diarrhea, and yeast infections.
Otezla Pregnancy And Lactation Text: This medication is Pregnancy Category C and it isn't known if it is safe during pregnancy. It is unknown if it is excreted in breast milk.
Xelstevanz Pregnancy And Lactation Text: This medication is Pregnancy Category D and is not considered safe during pregnancy.  The risk during breast feeding is also uncertain.
Low Dose Naltrexone Counseling- I discussed with the patient the potential risks and side effects of low dose naltrexone including but not limited to: more vivid dreams, headaches, nausea, vomiting, abdominal pain, fatigue, dizziness, and anxiety.
Hyrimoz Counseling:  I discussed with the patient the risks of adalimumab including but not limited to myelosuppression, immunosuppression, autoimmune hepatitis, demyelinating diseases, lymphoma, and serious infections.  The patient understands that monitoring is required including a PPD at baseline and must alert us or the primary physician if symptoms of infection or other concerning signs are noted.
Benzoyl Peroxide Counseling: Patient counseled that medicine may cause skin irritation and bleach clothing.  In the event of skin irritation, the patient was advised to reduce the amount of the drug applied or use it less frequently.   The patient verbalized understanding of the proper use and possible adverse effects of benzoyl peroxide.  All of the patient's questions and concerns were addressed.
Topical Clindamycin Counseling: Patient counseled that this medication may cause skin irritation or allergic reactions.  In the event of skin irritation, the patient was advised to reduce the amount of the drug applied or use it less frequently.   The patient verbalized understanding of the proper use and possible adverse effects of clindamycin.  All of the patient's questions and concerns were addressed.
Erythromycin Counseling:  I discussed with the patient the risks of erythromycin including but not limited to GI upset, allergic reaction, drug rash, diarrhea, increase in liver enzymes, and yeast infections.
Colchicine Pregnancy And Lactation Text: This medication is Pregnancy Category C and isn't considered safe during pregnancy. It is excreted in breast milk.
Adbry Counseling: I discussed with the patient the risks of tralokinumab including but not limited to eye infection and irritation, cold sores, injection site reactions, worsening of asthma, allergic reactions and increased risk of parasitic infection.  Live vaccines should be avoided while taking tralokinumab. The patient understands that monitoring is required and they must alert us or the primary physician if symptoms of infection or other concerning signs are noted.
Tranexamic Acid Pregnancy And Lactation Text: It is unknown if this medication is safe during pregnancy or breast feeding.
Itraconazole Counseling:  I discussed with the patient the risks of itraconazole including but not limited to liver damage, nausea/vomiting, neuropathy, and severe allergy.  The patient understands that this medication is best absorbed when taken with acidic beverages such as non-diet cola or ginger ale.  The patient understands that monitoring is required including baseline LFTs and repeat LFTs at intervals.  The patient understands that they are to contact us or the primary physician if concerning signs are noted.
High Dose Vitamin A Counseling: Side effects reviewed, pt to contact office should one occur.
Rifampin Pregnancy And Lactation Text: This medication is Pregnancy Category C and it isn't know if it is safe during pregnancy. It is also excreted in breast milk and should not be used if you are breast feeding.
Siliq Counseling:  I discussed with the patient the risks of Siliq including but not limited to new or worsening depression, suicidal thoughts and behavior, immunosuppression, malignancy, posterior leukoencephalopathy syndrome, and serious infections.  The patient understands that monitoring is required including a PPD at baseline and must alert us or the primary physician if symptoms of infection or other concerning signs are noted. There is also a special program designed to monitor depression which is required with Siliq.
Wartpeel Counseling:  I discussed with the patient the risks of Wartpeel including but not limited to erythema, scaling, itching, weeping, crusting, and pain.
Erythromycin Pregnancy And Lactation Text: This medication is Pregnancy Category B and is considered safe during pregnancy. It is also excreted in breast milk.
Adbry Pregnancy And Lactation Text: It is unknown if this medication will adversely affect pregnancy or breast feeding.
Wegovy Pregnancy And Lactation Text: The fetal risk of this medication is unknown and taking while pregnant is not recommended. It is unknown if this medication is present in breast milk.
Solaraze Counseling:  I discussed with the patient the risks of Solaraze including but not limited to erythema, scaling, itching, weeping, crusting, and pain.
Sarecycline Counseling: Patient advised regarding possible photosensitivity and discoloration of the teeth, skin, lips, tongue and gums.  Patient instructed to avoid sunlight, if possible.  When exposed to sunlight, patients should wear protective clothing, sunglasses, and sunscreen.  The patient was instructed to call the office immediately if the following severe adverse effects occur:  hearing changes, easy bruising/bleeding, severe headache, or vision changes.  The patient verbalized understanding of the proper use and possible adverse effects of sarecycline.  All of the patient's questions and concerns were addressed.
Sotyktu Counseling:  I discussed the most common side effects of Sotyktu including: common cold, sore throat, sinus infections, cold sores, canker sores, folliculitis, and acne.  I also discussed more serious side effects of Sotyktu including but not limited to: serious allergic reactions; increased risk for infections such as TB; cancers such as lymphomas; rhabdomyolysis and elevated CPK; and elevated triglycerides and liver enzymes. 
Cibinqo Pregnancy And Lactation Text: It is unknown if this medication will adversely affect pregnancy or breast feeding.  You should not take this medication if you are currently pregnant or planning a pregnancy or while breastfeeding.
Dapsone Counseling: I discussed with the patient the risks of dapsone including but not limited to hemolytic anemia, agranulocytosis, rashes, methemoglobinemia, kidney failure, peripheral neuropathy, headaches, GI upset, and liver toxicity.  Patients who start dapsone require monitoring including baseline LFTs and weekly CBCs for the first month, then every month thereafter.  The patient verbalized understanding of the proper use and possible adverse effects of dapsone.  All of the patient's questions and concerns were addressed.
Valtrex Counseling: I discussed with the patient the risks of valacyclovir including but not limited to kidney damage, nausea, vomiting and severe allergy.  The patient understands that if the infection seems to be worsening or is not improving, they are to call.
Itraconazole Pregnancy And Lactation Text: This medication is Pregnancy Category C and it isn't know if it is safe during pregnancy. It is also excreted in breast milk.
Wartpeel Pregnancy And Lactation Text: This medication is Pregnancy Category X and contraindicated in pregnancy and in women who may become pregnant. It is unknown if this medication is excreted in breast milk.
Elidel Counseling: Patient may experience a mild burning sensation during topical application. Elidel is not approved in children less than 2 years of age. There have been case reports of hematologic and skin malignancies in patients using topical calcineurin inhibitors although causality is questionable.
High Dose Vitamin A Pregnancy And Lactation Text: High dose vitamin A therapy is contraindicated during pregnancy and breast feeding.
Opioid Counseling: I discussed with the patient the potential side effects of opioids including but not limited to addiction, altered mental status, and depression. I stressed avoiding alcohol, benzodiazepines, muscle relaxants and sleep aids unless specifically okayed by a physician. The patient verbalized understanding of the proper use and possible adverse effects of opioids. All of the patient's questions and concerns were addressed. They were instructed to flush the remaining pills down the toilet if they did not need them for pain.
Bimzelx Counseling:  I discussed with the patient the risks of Bimzelx including but not limited to depression, immunosuppression, allergic reactions and infections.  The patient understands that monitoring is required including a PPD at baseline and must alert us or the primary physician if symptoms of infection or other concerning signs are noted.
Metronidazole Counseling:  I discussed with the patient the risks of metronidazole including but not limited to seizures, nausea/vomiting, a metallic taste in the mouth, nausea/vomiting and severe allergy.
Solaraze Pregnancy And Lactation Text: This medication is Pregnancy Category B and is considered safe. There is some data to suggest avoiding during the third trimester. It is unknown if this medication is excreted in breast milk.
Zepbound Counseling: I reviewed the possible side effects including: thyroid tumors, kidney disease, gallbladder disease, abdominal pain, constipation, diarrhea, nausea, vomiting and pancreatitis. Do not take this medication if you have a history or family history of multiple endocrine neoplasia syndrome type 2. Side effects reviewed, pt to contact office should one occur.
Litfulo Counseling: I discussed with the patient the risks of Litfulo therapy including but not limited to upper respiratory tract infections, shingles, cold sores, and nausea. Live vaccines should be avoided.  This medication has been linked to serious infections; higher rate of mortality; malignancy and lymphoproliferative disorders; major adverse cardiovascular events; thrombosis; gastrointestinal perforations; neutropenia; lymphopenia; anemia; liver enzyme elevations; and lipid elevations.
Sarecycline Pregnancy And Lactation Text: This medication is Pregnancy Category D and not consider safe during pregnancy. It is also excreted in breast milk.
Sotyktu Pregnancy And Lactation Text: There is insufficient data to evaluate whether or not Sotyktu is safe to use during pregnancy.   It is not known if Sotyktu passes into breast milk and whether or not it is safe to use when breastfeeding.  
Valtrex Pregnancy And Lactation Text: this medication is Pregnancy Category B and is considered safe during pregnancy. This medication is not directly found in breast milk but it's metabolite acyclovir is present.
Dupixent Pregnancy And Lactation Text: This medication likely crosses the placenta but the risk for the fetus is uncertain. This medication is excreted in breast milk.
Dapsone Pregnancy And Lactation Text: This medication is Pregnancy Category C and is not considered safe during pregnancy or breast feeding.
Soolantra Counseling: I discussed with the patients the risks of topial Soolantra. This is a medicine which decreases the number of mites and inflammation in the skin. You experience burning, stinging, eye irritation or allergic reactions.  Please call our office if you develop any problems from using this medication.
Simlandi Counseling:  I discussed with the patient the risks of adalimumab including but not limited to myelosuppression, immunosuppression, autoimmune hepatitis, demyelinating diseases, lymphoma, and serious infections.  The patient understands that monitoring is required including a PPD at baseline and must alert us or the primary physician if symptoms of infection or other concerning signs are noted.
Winlevi Counseling:  I discussed with the patient the risks of topical clascoterone including but not limited to erythema, scaling, itching, and stinging. Patient voiced their understanding.
Opioid Pregnancy And Lactation Text: These medications can lead to premature delivery and should be avoided during pregnancy. These medications are also present in breast milk in small amounts.
Bimzelx Pregnancy And Lactation Text: This medication crosses the placenta and the safety is uncertain during pregnancy. It is unknown if this medication is present in breast milk.
Litfulo Pregnancy And Lactation Text: Based on animal studies, Lifulo may cause embryo-fetal harm when administered to pregnant women.  The medication should not be used in pregnancy.  Breastfeeding is not recommended during treatment.
Gabapentin Counseling: I discussed with the patient the risks of gabapentin including but not limited to dizziness, somnolence, fatigue and ataxia.
Tetracycline Counseling: Patient counseled regarding possible photosensitivity and increased risk for sunburn.  Patient instructed to avoid sunlight, if possible.  When exposed to sunlight, patients should wear protective clothing, sunglasses, and sunscreen.  The patient was instructed to call the office immediately if the following severe adverse effects occur:  hearing changes, easy bruising/bleeding, severe headache, or vision changes.  The patient verbalized understanding of the proper use and possible adverse effects of tetracycline.  All of the patient's questions and concerns were addressed. Patient understands to avoid pregnancy while on therapy due to potential birth defects.
Ebglyss Counseling: I discussed with the patient the risks of lebrikizumab including but not limited to eye inflammation and irritation, cold sores, injection site reactions, allergic reactions and increased risk of parasitic infection. The patient understands that monitoring is required and they must alert us or the primary physician if symptoms of infection or other concerning signs are noted.
Winlevi Pregnancy And Lactation Text: This medication is considered safe during pregnancy and breastfeeding.
Eucrisa Counseling: Patient may experience a mild burning sensation during topical application. Eucrisa is not approved in children less than 3 months of age.
Soolantra Pregnancy And Lactation Text: This medication is Pregnancy Category C. This medication is considered safe during breast feeding.
Olumiant Counseling: I discussed with the patient the risks of Olumiant therapy including but not limited to upper respiratory tract infections, shingles, cold sores, and nausea. Live vaccines should be avoided.  This medication has been linked to serious infections; higher rate of mortality; malignancy and lymphoproliferative disorders; major adverse cardiovascular events; thrombosis; gastrointestinal perforations; neutropenia; lymphopenia; anemia; liver enzyme elevations; and lipid elevations.
Ebglyss Pregnancy And Lactation Text: This medication likely crosses the placenta but the risk for the fetus is uncertain. It is unknown if this medication is excreted in breast milk.
Cimzia Counseling:  I discussed with the patient the risks of Cimzia including but not limited to immunosuppression, allergic reactions and infections.  The patient understands that monitoring is required including a PPD at baseline and must alert us or the primary physician if symptoms of infection or other concerning signs are noted.
Simponi Counseling:  I discussed with the patient the risks of golimumab including but not limited to myelosuppression, immunosuppression, autoimmune hepatitis, demyelinating diseases, lymphoma, and serious infections.  The patient understands that monitoring is required including a PPD at baseline and must alert us or the primary physician if symptoms of infection or other concerning signs are noted.
VTAMA Counseling: I discussed with the patient that VTAMA is not for use in the eyes, mouth or mouth. They should call the office if they develop any signs of allergic reactions to VTAMA. The patient verbalized understanding of the proper use and possible adverse effects of VTAMA.  All of the patient's questions and concerns were addressed.
Detail Level: Zone
Minocycline Counseling: Patient advised regarding possible photosensitivity and discoloration of the teeth, skin, lips, tongue and gums.  Patient instructed to avoid sunlight, if possible.  When exposed to sunlight, patients should wear protective clothing, sunglasses, and sunscreen.  The patient was instructed to call the office immediately if the following severe adverse effects occur:  hearing changes, easy bruising/bleeding, severe headache, or vision changes.  The patient verbalized understanding of the proper use and possible adverse effects of minocycline.  All of the patient's questions and concerns were addressed.
Odomzo Counseling- I discussed with the patient the risks of Odomzo including but not limited to nausea, vomiting, diarrhea, constipation, weight loss, changes in the sense of taste, decreased appetite, muscle spasms, and hair loss.  The patient verbalized understanding of the proper use and possible adverse effects of Odomzo.  All of the patient's questions and concerns were addressed.
Acitretin Pregnancy And Lactation Text: This medication is Pregnancy Category X and should not be given to women who are pregnant or may become pregnant in the future. This medication is excreted in breast milk.
Calcipotriene Pregnancy And Lactation Text: The use of this medication during pregnancy or lactation is not recommended as there is insufficient data.
Saxenda Counseling: I reviewed the possible side effects including: thyroid tumors, kidney disease, gallbladder disease, abdominal pain, constipation, diarrhea, nausea, vomiting and pancreatitis. Do not take this medication if you have a history or family history of multiple endocrine neoplasia syndrome type 2. Side effects reviewed, pt to contact office should one occur.
Cyclosporine Counseling:  I discussed with the patient the risks of cyclosporine including but not limited to hypertension, gingival hyperplasia,myelosuppression, immunosuppression, liver damage, kidney damage, neurotoxicity, lymphoma, and serious infections. The patient understands that monitoring is required including baseline blood pressure, CBC, CMP, lipid panel and uric acid, and then 1-2 times monthly CMP and blood pressure.
Protopic Pregnancy And Lactation Text: This medication is Pregnancy Category C. It is unknown if this medication is excreted in breast milk when applied topically.
Nsaids Pregnancy And Lactation Text: These medications are considered safe up to 30 weeks gestation. It is excreted in breast milk.
Birth Control Pills Pregnancy And Lactation Text: This medication should be avoided if pregnant and for the first 30 days post-partum.
Terbinafine Counseling: Patient counseling regarding adverse effects of terbinafine including but not limited to headache, diarrhea, rash, upset stomach, liver function test abnormalities, itching, taste/smell disturbance, nausea, abdominal pain, and flatulence.  There is a rare possibility of liver failure that can occur when taking terbinafine.  The patient understands that a baseline LFT and kidney function test may be required. The patient verbalized understanding of the proper use and possible adverse effects of terbinafine.  All of the patient's questions and concerns were addressed.
Topical Metronidazole Pregnancy And Lactation Text: This medication is Pregnancy Category B and considered safe during pregnancy.  It is also considered safe to use while breastfeeding.
Tremfya Counseling: I discussed with the patient the risks of guselkumab including but not limited to immunosuppression, serious infections, and drug reactions.  The patient understands that monitoring is required including a PPD at baseline and must alert us or the primary physician if symptoms of infection or other concerning signs are noted.
Clindamycin Counseling: I counseled the patient regarding use of clindamycin as an antibiotic for prophylactic and/or therapeutic purposes. Clindamycin is active against numerous classes of bacteria, including skin bacteria. Side effects may include nausea, diarrhea, gastrointestinal upset, rash, hives, yeast infections, and in rare cases, colitis.
Arava Pregnancy And Lactation Text: This medication is Pregnancy Category X and is absolutely contraindicated during pregnancy. It is unknown if it is excreted in breast milk.
Sski Pregnancy And Lactation Text: This medication is Pregnancy Category D and isn't considered safe during pregnancy. It is excreted in breast milk.
Cantharidin Counseling:  I discussed with the patient the risks of Cantharidin including but not limited to pain, redness, burning, itching, and blistering.
Bexarotene Counseling:  I discussed with the patient the risks of bexarotene including but not limited to hair loss, dry lips/skin/eyes, liver abnormalities, hyperlipidemia, pancreatitis, depression/suicidal ideation, photosensitivity, drug rash/allergic reactions, hypothyroidism, anemia, leukopenia, infection, cataracts, and teratogenicity.  Patient understands that they will need regular blood tests to check lipid profile, liver function tests, white blood cell count, thyroid function tests and pregnancy test if applicable.
Nemluvio Counseling: I discussed with the patient the risks of nemolizumab including but not limited to headache, gastrointestinal complaints, nasopharyngitis, musculoskeletal complaints, injection site reactions, and allergic reactions. The patient understands that monitoring is required and they must alert us or the primary physician if any side effects are noted.
Topical Steroids Counseling: I discussed with the patient that prolonged use of topical steroids can result in the increased appearance of superficial blood vessels (telangiectasias), lightening (hypopigmentation) and thinning of the skin (atrophy).  Patient understands to avoid using high potency steroids in skin folds, the groin or the face.  The patient verbalized understanding of the proper use and possible adverse effects of topical steroids.  All of the patient's questions and concerns were addressed.
Spironolactone Counseling: Patient advised regarding risks of diarrhea, abdominal pain, hyperkalemia, birth defects (for female patients), liver toxicity and renal toxicity. The patient may need blood work to monitor liver and kidney function and potassium levels while on therapy. The patient verbalized understanding of the proper use and possible adverse effects of spironolactone.  All of the patient's questions and concerns were addressed.
Olanzapine Counseling- I discussed with the patient the common side effects of olanzapine including but are not limited to: lack of energy, dry mouth, increased appetite, sleepiness, tremor, constipation, dizziness, changes in behavior, or restlessness.  Explained that teenagers are more likely to experience headaches, abdominal pain, pain in the arms or legs, tiredness, and sleepiness.  Serious side effects include but are not limited: increased risk of death in elderly patients who are confused, have memory loss, or dementia-related psychosis; hyperglycemia; increased cholesterol and triglycerides; and weight gain.
Mirvaso Pregnancy And Lactation Text: This medication has not been assigned a Pregnancy Risk Category. It is unknown if the medication is excreted in breast milk.
Fluconazole Counseling:  Patient counseled regarding adverse effects of fluconazole including but not limited to headache, diarrhea, nausea, upset stomach, liver function test abnormalities, taste disturbance, and stomach pain.  There is a rare possibility of liver failure that can occur when taking fluconazole.  The patient understands that monitoring of LFTs and kidney function test may be required, especially at baseline. The patient verbalized understanding of the proper use and possible adverse effects of fluconazole.  All of the patient's questions and concerns were addressed.
Qbrexza Counseling:  I discussed with the patient the risks of Qbrexza including but not limited to headache, mydriasis, blurred vision, dry eyes, nasal dryness, dry mouth, dry throat, dry skin, urinary hesitation, and constipation.  Local skin reactions including erythema, burning, stinging, and itching can also occur.
Clindamycin Pregnancy And Lactation Text: This medication can be used in pregnancy if certain situations. Clindamycin is also present in breast milk.
Quinolones Counseling:  I discussed with the patient the risks of fluoroquinolones including but not limited to GI upset, allergic reaction, drug rash, diarrhea, dizziness, photosensitivity, yeast infections, liver function test abnormalities, tendonitis/tendon rupture.
Clofazimine Counseling:  I discussed with the patient the risks of clofazimine including but not limited to skin and eye pigmentation, liver damage, nausea/vomiting, gastrointestinal bleeding and allergy.
Thalidomide Counseling: I discussed with the patient the risks of thalidomide including but not limited to birth defects, anxiety, weakness, chest pain, dizziness, cough and severe allergy.
Spironolactone Pregnancy And Lactation Text: This medication can cause feminization of the male fetus and should be avoided during pregnancy. The active metabolite is also found in breast milk.
Topical Steroids Applications Pregnancy And Lactation Text: Most topical steroids are considered safe to use during pregnancy and lactation.  Any topical steroid applied to the breast or nipple should be washed off before breastfeeding.
Nemluvio Pregnancy And Lactation Text: It is not known if Nemluvio causes fetal harm or is present in breast milk. Please proceed with caution if patients who are pregnant or breastfeeding.
5-Fu Counseling: 5-Fluorouracil Counseling:  I discussed with the patient the risks of 5-fluorouracil including but not limited to erythema, scaling, itching, weeping, crusting, and pain.
Opzelura Counseling:  I discussed with the patient the risks of Opzelura including but not limited to nasopharngitis, bronchitis, ear infection, eosinophila, hives, diarrhea, folliculitis, tonsillitis, and rhinorrhea.  Taken orally, this medication has been linked to serious infections; higher rate of mortality; malignancy and lymphoproliferative disorders; major adverse cardiovascular events; thrombosis; thrombocytopenia, anemia, and neutropenia; and lipid elevations.
Olanzapine Pregnancy And Lactation Text: This medication is pregnancy category C.   There are no adequate and well controlled trials with olanzapine in pregnant females.  Olanzapine should be used during pregnancy only if the potential benefit justifies the potential risk to the fetus.   In a study in lactating healthy women, olanzapine was excreted in breast milk.  It is recommended that women taking olanzapine should not breast feed.
Bexarotene Pregnancy And Lactation Text: This medication is Pregnancy Category X and should not be given to women who are pregnant or may become pregnant. This medication should not be used if you are breast feeding.
Semaglutide Counseling: I reviewed the possible side effects including: thyroid tumors, kidney disease, gallbladder disease, abdominal pain, constipation, diarrhea, nausea, vomiting and pancreatitis. Do not take this medication if you have a history or family history of multiple endocrine neoplasia syndrome type 2. Side effects reviewed, pt to contact office should one occur.
Qbrexza Pregnancy And Lactation Text: There is no available data on Qbrexza use in pregnant women.  There is no available data on Qbrexza use in lactation.
Doxycycline Counseling:  Patient counseled regarding possible photosensitivity and increased risk for sunburn.  Patient instructed to avoid sunlight, if possible.  When exposed to sunlight, patients should wear protective clothing, sunglasses, and sunscreen.  The patient was instructed to call the office immediately if the following severe adverse effects occur:  hearing changes, easy bruising/bleeding, severe headache, or vision changes.  The patient verbalized understanding of the proper use and possible adverse effects of doxycycline.  All of the patient's questions and concerns were addressed.
Methotrexate Counseling:  Patient counseled regarding adverse effects of methotrexate including but not limited to nausea, vomiting, abnormalities in liver function tests. Patients may develop mouth sores, rash, diarrhea, and abnormalities in blood counts. The patient understands that monitoring is required including LFT's and blood counts.  There is a rare possibility of scarring of the liver and lung problems that can occur when taking methotrexate. Persistent nausea, loss of appetite, pale stools, dark urine, cough, and shortness of breath should be reported immediately. Patient advised to discontinue methotrexate treatment at least three months before attempting to become pregnant.  I discussed the need for folate supplements while taking methotrexate.  These supplements can decrease side effects during methotrexate treatment. The patient verbalized understanding of the proper use and possible adverse effects of methotrexate.  All of the patient's questions and concerns were addressed.
Griseofulvin Counseling:  I discussed with the patient the risks of griseofulvin including but not limited to photosensitivity, cytopenia, liver damage, nausea/vomiting and severe allergy.  The patient understands that this medication is best absorbed when taken with a fatty meal (e.g., ice cream or french fries).
Xolair Counseling:  Patient informed of potential adverse effects including but not limited to fever, muscle aches, rash and allergic reactions.  The patient verbalized understanding of the proper use and possible adverse effects of Xolair.  All of the patient's questions and concerns were addressed.
Rhofade Counseling: Rhofade is a topical medication which can decrease superficial blood flow where applied. Side effects are uncommon and include stinging, redness and allergic reactions.
Topical Sulfur Applications Counseling: Topical Sulfur Counseling: Patient counseled that this medication may cause skin irritation or allergic reactions.  In the event of skin irritation, the patient was advised to reduce the amount of the drug applied or use it less frequently.   The patient verbalized understanding of the proper use and possible adverse effects of topical sulfur application.  All of the patient's questions and concerns were addressed.
Isotretinoin Counseling: Patient should get monthly blood tests, not donate blood, not drive at night if vision affected, not share medication, and not undergo elective surgery for 6 months after tx completed. Side effects reviewed, pt to contact office should one occur.
Rituxan Counseling:  I discussed with the patient the risks of Rituxan infusions. Side effects can include infusion reactions, severe drug rashes including mucocutaneous reactions, reactivation of latent hepatitis and other infections and rarely progressive multifocal leukoencephalopathy.  All of the patient's questions and concerns were addressed.
Methotrexate Pregnancy And Lactation Text: This medication is Pregnancy Category X and is known to cause fetal harm. This medication is excreted in breast milk.
Oral Minoxidil Counseling- I discussed with the patient the risks of oral minoxidil including but not limited to shortness of breath, swelling of the feet or ankles, dizziness, lightheadedness, unwanted hair growth and allergic reaction.  The patient verbalized understanding of the proper use and possible adverse effects of oral minoxidil.  All of the patient's questions and concerns were addressed.
Tranexamic Acid Counseling:  Patient advised of the small risk of bleeding problems with tranexamic acid. They were also instructed to call if they developed any nausea, vomiting or diarrhea. All of the patient's questions and concerns were addressed.
Doxycycline Pregnancy And Lactation Text: This medication is Pregnancy Category D and not consider safe during pregnancy. It is also excreted in breast milk but is considered safe for shorter treatment courses.
Colchicine Counseling:  Patient counseled regarding adverse effects including but not limited to stomach upset (nausea, vomiting, stomach pain, or diarrhea).  Patient instructed to limit alcohol consumption while taking this medication.  Colchicine may reduce blood counts especially with prolonged use.  The patient understands that monitoring of kidney function and blood counts may be required, especially at baseline. The patient verbalized understanding of the proper use and possible adverse effects of colchicine.  All of the patient's questions and concerns were addressed.
Xolair Pregnancy And Lactation Text: This medication is Pregnancy Category B and is considered safe during pregnancy. This medication is excreted in breast milk.
Isotretinoin Pregnancy And Lactation Text: This medication is Pregnancy Category X and is considered extremely dangerous during pregnancy. It is unknown if it is excreted in breast milk.
Topical Sulfur Applications Pregnancy And Lactation Text: This medication is considered safe during pregnancy and breast feeding secondary to limited systemic absorption.
Rifampin Counseling: I discussed with the patient the risks of rifampin including but not limited to liver damage, kidney damage, red-orange body fluids, nausea/vomiting and severe allergy.
Griseofulvin Pregnancy And Lactation Text: This medication is Pregnancy Category X and is known to cause serious birth defects. It is unknown if this medication is excreted in breast milk but breast feeding should be avoided.
Drysol Counseling:  I discussed with the patient the risks of drysol/aluminum chloride including but not limited to skin rash, itching, irritation, burning.
Wegovy Counseling: I reviewed the possible side effects including: thyroid tumors, kidney disease, gallbladder disease, abdominal pain, constipation, diarrhea, nausea, vomiting and pancreatitis. Do not take this medication if you have a history or family history of multiple endocrine neoplasia syndrome type 2. Side effects reviewed, pt to contact office should one occur.
Prednisone Counseling:  I discussed with the patient the risks of prolonged use of prednisone including but not limited to weight gain, insomnia, osteoporosis, mood changes, diabetes, susceptibility to infection, glaucoma and high blood pressure.  In cases where prednisone use is prolonged, patients should be monitored with blood pressure checks, serum glucose levels and an eye exam.  Additionally, the patient may need to be placed on GI prophylaxis, PCP prophylaxis, and calcium and vitamin D supplementation and/or a bisphosphonate.  The patient verbalized understanding of the proper use and the possible adverse effects of prednisone.  All of the patient's questions and concerns were addressed.
Oral Minoxidil Pregnancy And Lactation Text: This medication should only be used when clearly needed if you are pregnant, attempting to become pregnant or breast feeding.
Cibinqo Counseling: I discussed with the patient the risks of Cibinqo therapy including but not limited to common cold, nausea, headache, cold sores, increased blood CPK levels, dizziness, UTIs, fatigue, acne, and vomitting. Live vaccines should be avoided.  This medication has been linked to serious infections; higher rate of mortality; malignancy and lymphoproliferative disorders; major adverse cardiovascular events; thrombosis; thrombocytopenia and lymphopenia; lipid elevations; and retinal detachment.
Rituxan Pregnancy And Lactation Text: This medication is Pregnancy Category C and it isn't know if it is safe during pregnancy. It is unknown if this medication is excreted in breast milk but similar antibodies are known to be excreted.
Doxepin Counseling:  Patient advised that the medication is sedating and not to drive a car after taking this medication. Patient informed of potential adverse effects including but not limited to dry mouth, urinary retention, and blurry vision.  The patient verbalized understanding of the proper use and possible adverse effects of doxepin.  All of the patient's questions and concerns were addressed.
Azithromycin Pregnancy And Lactation Text: This medication is considered safe during pregnancy and is also secreted in breast milk.
Azathioprine Pregnancy And Lactation Text: This medication is Pregnancy Category D and isn't considered safe during pregnancy. It is unknown if this medication is excreted in breast milk.
Dutasteride Pregnancy And Lactation Text: This medication is absolutely contraindicated in women, especially during pregnancy and breast feeding. Feminization of male fetuses is possible if taking while pregnant.
Klisyri Counseling:  I discussed with the patient the risks of Klisyri including but not limited to erythema, scaling, itching, weeping, crusting, and pain.
Spevigo Pregnancy And Lactation Text: The risk during pregnancy and breastfeeding is uncertain with this medication. This medication does cross the placenta. It is unknown if this medication is found in breast milk.
Opzelura Pregnancy And Lactation Text: There is insufficient data to evaluate drug-associated risk for major birth defects, miscarriage, or other adverse maternal or fetal outcomes.  There is a pregnancy registry that monitors pregnancy outcomes in pregnant persons exposed to the medication during pregnancy.  It is unknown if this medication is excreted in breast milk.  Do not breastfeed during treatment and for about 4 weeks after the last dose.
Oxybutynin Counseling:  I discussed with the patient the risks of oxybutynin including but not limited to skin rash, drowsiness, dry mouth, difficulty urinating, and blurred vision.
Topical Clindamycin Pregnancy And Lactation Text: This medication is Pregnancy Category B and is considered safe during pregnancy. It is unknown if it is excreted in breast milk.
Erivedge Counseling- I discussed with the patient the risks of Erivedge including but not limited to nausea, vomiting, diarrhea, constipation, weight loss, changes in the sense of taste, decreased appetite, muscle spasms, and hair loss.  The patient verbalized understanding of the proper use and possible adverse effects of Erivedge.  All of the patient's questions and concerns were addressed.
Benzoyl Peroxide Pregnancy And Lactation Text: This medication is Pregnancy Category C. It is unknown if benzoyl peroxide is excreted in breast milk.
Finasteride Male Counseling: Finasteride Counseling:  I discussed with the patient the risks of use of finasteride including but not limited to decreased libido, decreased ejaculate volume, gynecomastia, and depression. Women should not handle medication.  All of the patient's questions and concerns were addressed.
Low Dose Naltrexone Pregnancy And Lactation Text: Naltrexone is pregnancy category C.  There have been no adequate and well-controlled studies in pregnant women.  It should be used in pregnancy only if the potential benefit justifies the potential risk to the fetus.   Limited data indicates that naltrexone is minimally excreted into breastmilk.
Stelara Counseling:  I discussed with the patient the risks of ustekinumab including but not limited to immunosuppression, malignancy, posterior leukoencephalopathy syndrome, and serious infections.  The patient understands that monitoring is required including a PPD at baseline and must alert us or the primary physician if symptoms of infection or other concerning signs are noted.
Bactrim Counseling:  I discussed with the patient the risks of sulfa antibiotics including but not limited to GI upset, allergic reaction, drug rash, diarrhea, dizziness, photosensitivity, and yeast infections.  Rarely, more serious reactions can occur including but not limited to aplastic anemia, agranulocytosis, methemoglobinemia, blood dyscrasias, liver or kidney failure, lung infiltrates or desquamative/blistering drug rashes.
Cellcept Counseling:  I discussed with the patient the risks of mycophenolate mofetil including but not limited to infection/immunosuppression, GI upset, hypokalemia, hypercholesterolemia, bone marrow suppression, lymphoproliferative disorders, malignancy, GI ulceration/bleed/perforation, colitis, interstitial lung disease, kidney failure, progressive multifocal leukoencephalopathy, and birth defects.  The patient understands that monitoring is required including a baseline creatinine and regular CBC testing. In addition, patient must alert us immediately if symptoms of infection or other concerning signs are noted.
Doxepin Pregnancy And Lactation Text: This medication is Pregnancy Category C and it isn't known if it is safe during pregnancy. It is also excreted in breast milk and breast feeding isn't recommended.
Carac Counseling:  I discussed with the patient the risks of Carac including but not limited to erythema, scaling, itching, weeping, crusting, and pain.
Klisyri Pregnancy And Lactation Text: It is unknown if this medication can harm a developing fetus or if it is excreted in breast milk.
Topical Ketoconazole Counseling: Patient counseled that this medication may cause skin irritation or allergic reactions.  In the event of skin irritation, the patient was advised to reduce the amount of the drug applied or use it less frequently.   The patient verbalized understanding of the proper use and possible adverse effects of ketoconazole.  All of the patient's questions and concerns were addressed.
Ilumya Counseling: I discussed with the patient the risks of tildrakizumab including but not limited to immunosuppression, malignancy, posterior leukoencephalopathy syndrome, and serious infections.  The patient understands that monitoring is required including a PPD at baseline and must alert us or the primary physician if symptoms of infection or other concerning signs are noted.
Finasteride Female Counseling: Finasteride Counseling:  I discussed with the patient the risks of use of finasteride including but not limited to decreased libido and sexual dysfunction. Explained the teratogenic nature of the medication and stressed the importance of not getting pregnant during treatment. All of the patient's questions and concerns were addressed.
Hydroxyzine Counseling: Patient advised that the medication is sedating and not to drive a car after taking this medication.  Patient informed of potential adverse effects including but not limited to dry mouth, urinary retention, and blurry vision.  The patient verbalized understanding of the proper use and possible adverse effects of hydroxyzine.  All of the patient's questions and concerns were addressed.
Picato Counseling:  I discussed with the patient the risks of Picato including but not limited to erythema, scaling, itching, weeping, crusting, and pain.
Niacinamide Counseling: I recommended taking niacin or niacinamide, also know as vitamin B3, twice daily. Recent evidence suggests that taking vitamin B3 (500 mg twice daily) can reduce the risk of actinic keratoses and non-melanoma skin cancers. Side effects of vitamin B3 include flushing and headache.
Bactrim Pregnancy And Lactation Text: This medication is Pregnancy Category D and is known to cause fetal risk.  It is also excreted in breast milk.
Libtayo Counseling- I discussed with the patient the risks of Libtayo including but not limited to nausea, vomiting, diarrhea, and bone or muscle pain.  The patient verbalized understanding of the proper use and possible adverse effects of Libtayo.  All of the patient's questions and concerns were addressed.
Propranolol Counseling:  I discussed with the patient the risks of propranolol including but not limited to low heart rate, low blood pressure, low blood sugar, restlessness and increased cold sensitivity. They should call the office if they experience any of these side effects.
Finasteride Pregnancy And Lactation Text: This medication is absolutely contraindicated during pregnancy. It is unknown if it is excreted in breast milk.
Minoxidil Counseling: Minoxidil is a topical medication which can increase blood flow where it is applied. It is uncertain how this medication increases hair growth. Side effects are uncommon and include stinging and allergic reactions.
Cyclophosphamide Counseling:  I discussed with the patient the risks of cyclophosphamide including but not limited to hair loss, hormonal abnormalities, decreased fertility, abdominal pain, diarrhea, nausea and vomiting, bone marrow suppression and infection. The patient understands that monitoring is required while taking this medication.
Ketoconazole Counseling:   Patient counseled regarding improving absorption with orange juice.  Adverse effects include but are not limited to breast enlargement, headache, diarrhea, nausea, upset stomach, liver function test abnormalities, taste disturbance, and stomach pain.  There is a rare possibility of liver failure that can occur when taking ketoconazole. The patient understands that monitoring of LFTs may be required, especially at baseline. The patient verbalized understanding of the proper use and possible adverse effects of ketoconazole.  All of the patient's questions and concerns were addressed.
Niacinamide Pregnancy And Lactation Text: These medications are considered safe during pregnancy.
Ozempic Counseling: I reviewed the possible side effects including: thyroid tumors, kidney disease, gallbladder disease, abdominal pain, constipation, diarrhea, nausea, vomiting and pancreatitis. Do not take this medication if you have a history or family history of multiple endocrine neoplasia syndrome type 2. Side effects reviewed, pt to contact office should one occur.
Hydroxyzine Pregnancy And Lactation Text: This medication is not safe during pregnancy and should not be taken. It is also excreted in breast milk and breast feeding isn't recommended.
Taltz Counseling: I discussed with the patient the risks of ixekizumab including but not limited to immunosuppression, serious infections, worsening of inflammatory bowel disease and drug reactions.  The patient understands that monitoring is required including a PPD at baseline and must alert us or the primary physician if symptoms of infection or other concerning signs are noted.
Cephalexin Counseling: I counseled the patient regarding use of cephalexin as an antibiotic for prophylactic and/or therapeutic purposes. Cephalexin (commonly prescribed under brand name Keflex) is a cephalosporin antibiotic which is active against numerous classes of bacteria, including most skin bacteria. Side effects may include nausea, diarrhea, gastrointestinal upset, rash, hives, yeast infections, and in rare cases, hepatitis, kidney disease, seizures, fever, confusion, neurologic symptoms, and others. Patients with severe allergies to penicillin medications are cautioned that there is about a 10% incidence of cross-reactivity with cephalosporins. When possible, patients with penicillin allergies should use alternatives to cephalosporins for antibiotic therapy.
Metronidazole Pregnancy And Lactation Text: This medication is Pregnancy Category B and considered safe during pregnancy.  It is also excreted in breast milk.
Propranolol Pregnancy And Lactation Text: This medication is Pregnancy Category C and it isn't known if it is safe during pregnancy. It is excreted in breast milk.
Protopic Counseling: Patient may experience a mild burning sensation during topical application. Protopic is not approved in children less than 2 years of age. There have been case reports of hematologic and skin malignancies in patients using topical calcineurin inhibitors although causality is questionable.
Acitretin Counseling:  I discussed with the patient the risks of acitretin including but not limited to hair loss, dry lips/skin/eyes, liver damage, hyperlipidemia, depression/suicidal ideation, photosensitivity.  Serious rare side effects can include but are not limited to pancreatitis, pseudotumor cerebri, bony changes, clot formation/stroke/heart attack.  Patient understands that alcohol is contraindicated since it can result in liver toxicity and significantly prolong the elimination of the drug by many years.
Ketoconazole Pregnancy And Lactation Text: This medication is Pregnancy Category C and it isn't know if it is safe during pregnancy. It is also excreted in breast milk and breast feeding isn't recommended.
Topical Metronidazole Counseling: Metronidazole is a topical antibiotic medication. You may experience burning, stinging, redness, or allergic reactions.  Please call our office if you develop any problems from using this medication.
Birth Control Pills Counseling: Birth Control Pill Counseling: I discussed with the patient the potential side effects of OCPs including but not limited to increased risk of stroke, heart attack, thrombophlebitis, deep venous thrombosis, hepatic adenomas, breast changes, GI upset, headaches, and depression.  The patient verbalized understanding of the proper use and possible adverse effects of OCPs. All of the patient's questions and concerns were addressed.
Calcipotriene Counseling:  I discussed with the patient the risks of calcipotriene including but not limited to erythema, scaling, itching, and irritation.
Libtayo Pregnancy And Lactation Text: This medication is contraindicated in pregnancy and when breast feeding.
Cephalexin Pregnancy And Lactation Text: This medication is Pregnancy Category B and considered safe during pregnancy.  It is also excreted in breast milk but can be used safely for shorter doses.
Cyclophosphamide Pregnancy And Lactation Text: This medication is Pregnancy Category D and it isn't considered safe during pregnancy. This medication is excreted in breast milk.
Infliximab Counseling:  I discussed with the patient the risks of infliximab including but not limited to myelosuppression, immunosuppression, autoimmune hepatitis, demyelinating diseases, lymphoma, and serious infections.  The patient understands that monitoring is required including a PPD at baseline and must alert us or the primary physician if symptoms of infection or other concerning signs are noted.
Nsaids Counseling: NSAID Counseling: I discussed with the patient that NSAIDs should be taken with food. Prolonged use of NSAIDs can result in the development of stomach ulcers.  Patient advised to stop taking NSAIDs if abdominal pain occurs.  The patient verbalized understanding of the proper use and possible adverse effects of NSAIDs.  All of the patient's questions and concerns were addressed.
SSKI Counseling:  I discussed with the patient the risks of SSKI including but not limited to thyroid abnormalities, metallic taste, GI upset, fever, headache, acne, arthralgias, paraesthesias, lymphadenopathy, easy bleeding, arrhythmias, and allergic reaction.
Mirvaso Counseling: Mirvaso is a topical medication which can decrease superficial blood flow where applied. Side effects are uncommon and include stinging, redness and allergic reactions.
Arava Counseling:  Patient counseled regarding adverse effects of Arava including but not limited to nausea, vomiting, abnormalities in liver function tests. Patients may develop mouth sores, rash, diarrhea, and abnormalities in blood counts. The patient understands that monitoring is required including LFTs and blood counts.  There is a rare possibility of scarring of the liver and lung problems that can occur when taking methotrexate. Persistent nausea, loss of appetite, pale stools, dark urine, cough, and shortness of breath should be reported immediately. Patient advised to discontinue Arava treatment and consult with a physician prior to attempting conception. The patient will have to undergo a treatment to eliminate Arava from the body prior to conception.

## 2025-03-14 NOTE — PROCEDURE: PRESCRIPTION MEDICATION MANAGEMENT
Render In Strict Bullet Format?: No
Continue Regimen: Clobetasol 0.05% cream bid up to 3 weeks PRN
Detail Level: Zone

## 2025-03-19 ENCOUNTER — TELEPHONE (OUTPATIENT)
Facility: CLINIC | Age: 80
End: 2025-03-19

## 2025-06-10 ENCOUNTER — TELEPHONE (OUTPATIENT)
Facility: CLINIC | Age: 80
End: 2025-06-10

## 2025-06-10 NOTE — TELEPHONE ENCOUNTER
Karmen wanted results of blood test done at [facility] 6/3/2025.  TSH normal.  Comp panel normal.  However sed rate was elevated at 52.  Patient informed of results.  She is on Cimzia.  Dr. Covarrubias.  Covering Dr. Lewis.

## 2025-06-30 ENCOUNTER — TELEPHONE (OUTPATIENT)
Dept: RHEUMATOLOGY | Facility: CLINIC | Age: 80
End: 2025-06-30

## 2025-06-30 NOTE — TELEPHONE ENCOUNTER
Pt daughter called the office wanting a sooner appt and to see if parent is on the waitlist. Offered for today, 6/30/2025 at 12:40PM. Daughter states parents live approx 2 hours away and this would not work. Looked through provider schedule; pt is currently scheduled for soonest available. Stated to daughter to continue calling the office periodically for openings. Daughter voiced understanding with no further questions.

## 2025-08-19 ENCOUNTER — OFFICE VISIT (OUTPATIENT)
Dept: RHEUMATOLOGY | Facility: CLINIC | Age: 80
End: 2025-08-19

## 2025-08-19 VITALS
TEMPERATURE: 98 F | BODY MASS INDEX: 29.98 KG/M2 | RESPIRATION RATE: 16 BRPM | SYSTOLIC BLOOD PRESSURE: 122 MMHG | HEART RATE: 68 BPM | WEIGHT: 191 LBS | DIASTOLIC BLOOD PRESSURE: 70 MMHG | HEIGHT: 67 IN | OXYGEN SATURATION: 99 %

## 2025-08-19 DIAGNOSIS — M15.0 PRIMARY OSTEOARTHRITIS INVOLVING MULTIPLE JOINTS: ICD-10-CM

## 2025-08-19 DIAGNOSIS — L40.50 PSORIATIC ARTHRITIS (HCC): Primary | ICD-10-CM

## 2025-08-19 DIAGNOSIS — E27.40 CHRONIC ADRENAL INSUFFICIENCY (HCC): ICD-10-CM

## 2025-08-19 DIAGNOSIS — Z79.899 ENCOUNTER FOR DRUG THERAPY: ICD-10-CM

## 2025-08-19 DIAGNOSIS — D84.9 IMMUNOCOMPROMISED STATE (HCC): ICD-10-CM

## 2025-08-19 PROCEDURE — 99214 OFFICE O/P EST MOD 30 MIN: CPT | Performed by: INTERNAL MEDICINE

## 2025-08-19 RX ORDER — POLYETHYLENE GLYCOL 3350 17 G/17G
17 POWDER, FOR SOLUTION ORAL DAILY
COMMUNITY

## 2025-08-19 RX ORDER — CYCLOBENZAPRINE HCL 5 MG
5 TABLET ORAL 3 TIMES DAILY PRN
COMMUNITY
Start: 2025-06-25

## 2025-08-21 DIAGNOSIS — L40.50 PSORIATIC ARTHRITIS (HCC): ICD-10-CM

## 2025-08-21 DIAGNOSIS — M06.09 RHEUMATOID ARTHRITIS OF MULTIPLE SITES WITHOUT RHEUMATOID FACTOR (HCC): ICD-10-CM

## 2025-08-22 ENCOUNTER — PATIENT MESSAGE (OUTPATIENT)
Dept: RHEUMATOLOGY | Facility: CLINIC | Age: 80
End: 2025-08-22

## 2025-08-22 DIAGNOSIS — M06.09 RHEUMATOID ARTHRITIS OF MULTIPLE SITES WITHOUT RHEUMATOID FACTOR (HCC): ICD-10-CM

## 2025-08-22 DIAGNOSIS — L40.50 PSORIATIC ARTHRITIS (HCC): ICD-10-CM

## 2025-08-22 RX ORDER — LEFLUNOMIDE 10 MG/1
10 TABLET ORAL DAILY
Qty: 90 TABLET | Refills: 0 | Status: CANCELLED
Start: 2025-08-22

## 2025-08-25 RX ORDER — LEFLUNOMIDE 10 MG/1
10 TABLET ORAL DAILY
Qty: 30 TABLET | Refills: 0 | Status: SHIPPED | OUTPATIENT
Start: 2025-08-25 | End: 2025-08-25